# Patient Record
Sex: MALE | Race: WHITE | NOT HISPANIC OR LATINO | Employment: OTHER | URBAN - NONMETROPOLITAN AREA
[De-identification: names, ages, dates, MRNs, and addresses within clinical notes are randomized per-mention and may not be internally consistent; named-entity substitution may affect disease eponyms.]

---

## 2017-07-12 ENCOUNTER — LAB (OUTPATIENT)
Dept: LAB | Facility: HOSPITAL | Age: 27
End: 2017-07-12

## 2017-07-12 ENCOUNTER — TRANSCRIBE ORDERS (OUTPATIENT)
Dept: ADMINISTRATIVE | Facility: HOSPITAL | Age: 27
End: 2017-07-12

## 2017-07-12 DIAGNOSIS — Z00.00 ROUTINE GENERAL MEDICAL EXAMINATION AT A HEALTH CARE FACILITY: Primary | ICD-10-CM

## 2017-07-12 DIAGNOSIS — Z00.00 ROUTINE GENERAL MEDICAL EXAMINATION AT A HEALTH CARE FACILITY: ICD-10-CM

## 2017-07-12 DIAGNOSIS — F84.0 AUTISTIC DISORDER, CURRENT OR ACTIVE STATE: ICD-10-CM

## 2017-07-12 DIAGNOSIS — F71 MODERATE INTELLECTUAL DISABILITIES: ICD-10-CM

## 2017-07-12 LAB
ALBUMIN SERPL-MCNC: 3.6 G/DL (ref 3.5–5)
ALBUMIN/GLOB SERPL: 1.2 G/DL (ref 1–2)
ALP SERPL-CCNC: 37 U/L (ref 38–126)
ALT SERPL W P-5'-P-CCNC: 49 U/L (ref 13–69)
ANION GAP SERPL CALCULATED.3IONS-SCNC: 14 MMOL/L
AST SERPL-CCNC: 41 U/L (ref 15–46)
BASOPHILS # BLD AUTO: 0.06 10*3/MM3 (ref 0–0.2)
BASOPHILS NFR BLD AUTO: 0.7 % (ref 0–2.5)
BILIRUB SERPL-MCNC: 0.6 MG/DL (ref 0.2–1.3)
BUN BLD-MCNC: 17 MG/DL (ref 7–20)
BUN/CREAT SERPL: 15.5 (ref 6.3–21.9)
CALCIUM SPEC-SCNC: 9.2 MG/DL (ref 8.4–10.2)
CHLORIDE SERPL-SCNC: 108 MMOL/L (ref 98–107)
CHOLEST SERPL-MCNC: 171 MG/DL (ref 0–199)
CO2 SERPL-SCNC: 24 MMOL/L (ref 26–30)
CREAT BLD-MCNC: 1.1 MG/DL (ref 0.6–1.3)
DEPRECATED RDW RBC AUTO: 41.9 FL (ref 37–54)
EOSINOPHIL # BLD AUTO: 0.54 10*3/MM3 (ref 0–0.7)
EOSINOPHIL NFR BLD AUTO: 6.3 % (ref 0–7)
ERYTHROCYTE [DISTWIDTH] IN BLOOD BY AUTOMATED COUNT: 12.3 % (ref 11.5–14.5)
GFR SERPL CREATININE-BSD FRML MDRD: 80 ML/MIN/1.73
GLOBULIN UR ELPH-MCNC: 3 GM/DL
GLUCOSE BLD-MCNC: 89 MG/DL (ref 74–98)
HCT VFR BLD AUTO: 45 % (ref 42–52)
HDLC SERPL-MCNC: 39 MG/DL (ref 40–60)
HGB BLD-MCNC: 14.8 G/DL (ref 14–18)
IMM GRANULOCYTES # BLD: 0.07 10*3/MM3 (ref 0–0.06)
IMM GRANULOCYTES NFR BLD: 0.8 % (ref 0–0.6)
LDLC SERPL CALC-MCNC: 99 MG/DL (ref 0–99)
LDLC/HDLC SERPL: 2.53 {RATIO}
LYMPHOCYTES # BLD AUTO: 2.88 10*3/MM3 (ref 0.6–3.4)
LYMPHOCYTES NFR BLD AUTO: 33.8 % (ref 10–50)
MCH RBC QN AUTO: 30.4 PG (ref 27–31)
MCHC RBC AUTO-ENTMCNC: 32.9 G/DL (ref 30–37)
MCV RBC AUTO: 92.4 FL (ref 80–94)
MONOCYTES # BLD AUTO: 1.08 10*3/MM3 (ref 0–0.9)
MONOCYTES NFR BLD AUTO: 12.7 % (ref 0–12)
NEUTROPHILS # BLD AUTO: 3.88 10*3/MM3 (ref 2–6.9)
NEUTROPHILS NFR BLD AUTO: 45.7 % (ref 37–80)
NRBC BLD MANUAL-RTO: 0 /100 WBC (ref 0–0)
PLATELET # BLD AUTO: 104 10*3/MM3 (ref 130–400)
PMV BLD AUTO: 10 FL (ref 6–12)
POTASSIUM BLD-SCNC: 4 MMOL/L (ref 3.5–5.1)
PROT SERPL-MCNC: 6.6 G/DL (ref 6.3–8.2)
RBC # BLD AUTO: 4.87 10*6/MM3 (ref 4.7–6.1)
SODIUM BLD-SCNC: 142 MMOL/L (ref 137–145)
TRIGL SERPL-MCNC: 167 MG/DL
VALPROATE SERPL-MCNC: 81.8 MCG/ML (ref 50–100)
VLDLC SERPL-MCNC: 33.4 MG/DL
WBC NRBC COR # BLD: 8.51 10*3/MM3 (ref 4.8–10.8)

## 2017-07-12 PROCEDURE — 80061 LIPID PANEL: CPT | Performed by: PSYCHIATRY & NEUROLOGY

## 2017-07-12 PROCEDURE — 80053 COMPREHEN METABOLIC PANEL: CPT | Performed by: PSYCHIATRY & NEUROLOGY

## 2017-07-12 PROCEDURE — 80164 ASSAY DIPROPYLACETIC ACD TOT: CPT | Performed by: PSYCHIATRY & NEUROLOGY

## 2017-07-12 PROCEDURE — 84146 ASSAY OF PROLACTIN: CPT | Performed by: PSYCHIATRY & NEUROLOGY

## 2017-07-12 PROCEDURE — 36415 COLL VENOUS BLD VENIPUNCTURE: CPT | Performed by: PSYCHIATRY & NEUROLOGY

## 2017-07-12 PROCEDURE — 80201 ASSAY OF TOPIRAMATE: CPT | Performed by: PSYCHIATRY & NEUROLOGY

## 2017-07-12 PROCEDURE — 83525 ASSAY OF INSULIN: CPT | Performed by: PSYCHIATRY & NEUROLOGY

## 2017-07-12 PROCEDURE — 85025 COMPLETE CBC W/AUTO DIFF WBC: CPT | Performed by: PSYCHIATRY & NEUROLOGY

## 2017-07-14 LAB
INSULIN SERPL-ACNC: 11.4 UIU/ML (ref 2.6–24.9)
PROLACTIN SERPL-MCNC: 90.2 NG/ML (ref 4–15.2)
TOPIRAMATE, SERUM: 4.2 UG/ML (ref 2–25)

## 2017-10-11 PROBLEM — D69.6 THROMBOCYTOPENIA (HCC): Status: ACTIVE | Noted: 2017-10-11

## 2017-10-13 ENCOUNTER — CONSULT (OUTPATIENT)
Dept: ONCOLOGY | Facility: CLINIC | Age: 27
End: 2017-10-13

## 2017-10-13 ENCOUNTER — LAB (OUTPATIENT)
Dept: LAB | Facility: HOSPITAL | Age: 27
End: 2017-10-13

## 2017-10-13 VITALS
HEART RATE: 104 BPM | TEMPERATURE: 97 F | SYSTOLIC BLOOD PRESSURE: 135 MMHG | WEIGHT: 246 LBS | DIASTOLIC BLOOD PRESSURE: 80 MMHG | HEIGHT: 68 IN | BODY MASS INDEX: 37.28 KG/M2 | RESPIRATION RATE: 20 BRPM

## 2017-10-13 DIAGNOSIS — D69.6 THROMBOCYTOPENIA (HCC): ICD-10-CM

## 2017-10-13 DIAGNOSIS — D69.6 THROMBOCYTOPENIA (HCC): Primary | ICD-10-CM

## 2017-10-13 LAB
ALBUMIN SERPL-MCNC: 3.8 G/DL (ref 3.5–5)
ALBUMIN/GLOB SERPL: 1.4 G/DL (ref 1–2)
ALP SERPL-CCNC: 44 U/L (ref 38–126)
ALT SERPL W P-5'-P-CCNC: 30 U/L (ref 13–69)
ANION GAP SERPL CALCULATED.3IONS-SCNC: 14.9 MMOL/L
AST SERPL-CCNC: 24 U/L (ref 15–46)
BASOPHILS # BLD AUTO: 0.05 10*3/MM3 (ref 0–0.2)
BASOPHILS NFR BLD AUTO: 0.6 % (ref 0–2.5)
BILIRUB SERPL-MCNC: 0.3 MG/DL (ref 0.2–1.3)
BUN BLD-MCNC: 15 MG/DL (ref 7–20)
BUN/CREAT SERPL: 13.6 (ref 6.3–21.9)
CALCIUM SPEC-SCNC: 9.8 MG/DL (ref 8.4–10.2)
CHLORIDE SERPL-SCNC: 109 MMOL/L (ref 98–107)
CO2 SERPL-SCNC: 26 MMOL/L (ref 26–30)
CREAT BLD-MCNC: 1.1 MG/DL (ref 0.6–1.3)
DEPRECATED RDW RBC AUTO: 40.9 FL (ref 37–54)
EOSINOPHIL # BLD AUTO: 0.43 10*3/MM3 (ref 0–0.7)
EOSINOPHIL NFR BLD AUTO: 5.5 % (ref 0–7)
ERYTHROCYTE [DISTWIDTH] IN BLOOD BY AUTOMATED COUNT: 12 % (ref 11.5–14.5)
FOLATE SERPL-MCNC: 10.5 NG/ML
GFR SERPL CREATININE-BSD FRML MDRD: 80 ML/MIN/1.73
GLOBULIN UR ELPH-MCNC: 2.7 GM/DL
GLUCOSE BLD-MCNC: 99 MG/DL (ref 74–98)
HCT VFR BLD AUTO: 44.2 % (ref 42–52)
HGB BLD-MCNC: 14.7 G/DL (ref 14–18)
IMM GRANULOCYTES # BLD: 0.04 10*3/MM3 (ref 0–0.06)
IMM GRANULOCYTES NFR BLD: 0.5 % (ref 0–0.6)
LYMPHOCYTES # BLD AUTO: 3.47 10*3/MM3 (ref 0.6–3.4)
LYMPHOCYTES NFR BLD AUTO: 44.7 % (ref 10–50)
MCH RBC QN AUTO: 30.8 PG (ref 27–31)
MCHC RBC AUTO-ENTMCNC: 33.3 G/DL (ref 30–37)
MCV RBC AUTO: 92.7 FL (ref 80–94)
MONOCYTES # BLD AUTO: 1.06 10*3/MM3 (ref 0–0.9)
MONOCYTES NFR BLD AUTO: 13.7 % (ref 0–12)
NEUTROPHILS # BLD AUTO: 2.71 10*3/MM3 (ref 2–6.9)
NEUTROPHILS NFR BLD AUTO: 35 % (ref 37–80)
NRBC BLD MANUAL-RTO: 0 /100 WBC (ref 0–0)
PLATELET # BLD AUTO: 110 10*3/MM3 (ref 130–400)
PMV BLD AUTO: 10.1 FL (ref 6–12)
POTASSIUM BLD-SCNC: 3.9 MMOL/L (ref 3.5–5.1)
PROT SERPL-MCNC: 6.5 G/DL (ref 6.3–8.2)
RBC # BLD AUTO: 4.77 10*6/MM3 (ref 4.7–6.1)
SODIUM BLD-SCNC: 146 MMOL/L (ref 137–145)
VIT B12 BLD-MCNC: 788 PG/ML (ref 239–931)
WBC NRBC COR # BLD: 7.76 10*3/MM3 (ref 4.8–10.8)

## 2017-10-13 PROCEDURE — 85025 COMPLETE CBC W/AUTO DIFF WBC: CPT | Performed by: NURSE PRACTITIONER

## 2017-10-13 PROCEDURE — 82607 VITAMIN B-12: CPT | Performed by: NURSE PRACTITIONER

## 2017-10-13 PROCEDURE — 85060 BLOOD SMEAR INTERPRETATION: CPT | Performed by: NURSE PRACTITIONER

## 2017-10-13 PROCEDURE — 36415 COLL VENOUS BLD VENIPUNCTURE: CPT | Performed by: INTERNAL MEDICINE

## 2017-10-13 PROCEDURE — 99214 OFFICE O/P EST MOD 30 MIN: CPT | Performed by: INTERNAL MEDICINE

## 2017-10-13 PROCEDURE — 80053 COMPREHEN METABOLIC PANEL: CPT | Performed by: NURSE PRACTITIONER

## 2017-10-13 PROCEDURE — 82746 ASSAY OF FOLIC ACID SERUM: CPT | Performed by: NURSE PRACTITIONER

## 2017-10-13 RX ORDER — DIVALPROEX SODIUM 250 MG/1
1000 TABLET, DELAYED RELEASE ORAL
COMMUNITY
End: 2019-11-25 | Stop reason: DRUGHIGH

## 2017-10-13 NOTE — PROGRESS NOTES
Subjective     PROBLEM LIST:  1. Thrombocytopenia  2. Autism  3. Epilepsy     CHIEF COMPLAINT: thrombocytopenia      HISTORY OF PRESENT ILLNESS:  The patient is a 27 y.o. year old male, referred for evaluation of low platelets.  In March 2017 he had a platelet count of 109.  In August 2017 platelets were 120.  The remainder of his CBC is normal.  His  who accompanies him says that she thinks this is new in the past 6 months.  He has not had any bruising or bleeding.  The patient's communication is limited but he does mention that his knees bother him and that he may have some abdominal pain.    REVIEW OF SYSTEMS:  Unable to perform due to mental disability    Past Medical History:   Diagnosis Date   • Adjustment disorder with disturbance of conduct    • Intellectual disability          Current Outpatient Prescriptions on File Prior to Visit   Medication Sig Dispense Refill   • busPIRone (BUSPAR) 15 MG tablet Take 15 mg by mouth 3 (three) times a day.     • diphenhydrAMINE (BENADRYL) 25 mg capsule Take 25 mg by mouth every night.     • loratadine (CLARITIN) 10 MG tablet Take 10 mg by mouth daily.     • risperiDONE (RisperDAL) 3 MG tablet Take 3 mg by mouth 2 (two) times a day.     • topiramate (TOPAMAX) 50 MG tablet Take 50 mg by mouth 2 (two) times a day.     • trihexyphenidyl (ARTANE) 2 MG tablet Take 2 mg by mouth 3 (three) times a day with meals.       No current facility-administered medications on file prior to visit.        Allergies   Allergen Reactions   • Ceclor [Cefaclor]    • Erythromycin    • Ritalin [Methylphenidate Hcl]        No past surgical history on file.    Social History     Social History   • Marital status: Single     Spouse name: N/A   • Number of children: N/A   • Years of education: N/A     Social History Main Topics   • Smoking status: Never Smoker   • Smokeless tobacco: None   • Alcohol use No   • Drug use: None   • Sexual activity: Not Asked     Other Topics Concern   • None  "    Social History Narrative   He lives in assisted living.  No alcohol or drug use    Family History   Problem Relation Age of Onset   • Family history unknown: Yes   Family history is unknown.    Objective     /80  Pulse 104  Temp 97 °F (36.1 °C) (Temporal Artery )   Resp 20  Ht 68\" (172.7 cm)  Wt 246 lb (112 kg)  BMI 37.4 kg/m2  Performance Status: 1  General: well appearing male in no acute distress  Neuro: alert and oriented  HEENT: sclera anicteric, oropharynx clear  Lymphatics: no cervical, supraclavicular, or axillary adenopathy  Cardiovascular: regular rate and rhythm, no murmurs  Lungs: clear to auscultation bilaterally  Abdomen: Some mild tenderness to palpation, soft, no palpable mass  Extremeties: no lower extremity edema  Skin: no rashes, lesions, bruising, or petechiae  Psych: mood and affect appropriate    Abs: CBC shows a platelet count of 110 today.  The remainder of the CBC is unremarkable.CMP is unremarkable.        Assessment/Plan     Alton Stallings is a 27 y.o. year old male ith a history of autism and epilepsy who presents for evaluation of chronic thrombocytopenia.  His plate count has been in the range of 110-120 over the past 6 months.  I do not have any older labs for comparison.  Given his young age, and lack of other blood abnormalities or symptoms, I think this is most likely medication related finding.  Valproic acid can cause thrombocytopenia in a significant number of patients, and this tends to be a dose-related phenomenon.  His  reports that his valproic acid dose was increased a few years ago.  We will also check a folic acid and vitamin B12 level today and review a peripheral smear.  We will see him back in a few weeks to review these results.           Ora Weeks MD    10/13/2017        "

## 2017-10-20 LAB
CYTOLOGIST CVX/VAG CYTO: NORMAL
PATH INTERP BLD-IMP: NORMAL

## 2017-11-03 ENCOUNTER — OFFICE VISIT (OUTPATIENT)
Dept: ONCOLOGY | Facility: CLINIC | Age: 27
End: 2017-11-03

## 2017-11-03 VITALS
WEIGHT: 246 LBS | HEART RATE: 105 BPM | TEMPERATURE: 97.4 F | DIASTOLIC BLOOD PRESSURE: 92 MMHG | BODY MASS INDEX: 37.4 KG/M2 | RESPIRATION RATE: 20 BRPM | SYSTOLIC BLOOD PRESSURE: 129 MMHG

## 2017-11-03 DIAGNOSIS — D69.6 THROMBOCYTOPENIA (HCC): Primary | ICD-10-CM

## 2017-11-03 PROCEDURE — 99213 OFFICE O/P EST LOW 20 MIN: CPT | Performed by: INTERNAL MEDICINE

## 2017-11-03 NOTE — PROGRESS NOTES
PROBLEM LIST:  1. Thrombocytopenia  2. Autism  3. Epilepsy        Subjective     HISTORY OF PRESENT ILLNESS:   Alton Dunlap returns for follow-up.   He is here to review his lab results.    Past Medical History, Past Surgical History, Social History, Family History have been reviewed and are without significant changes except as mentioned.    Review of Systems   A comprehensive 14 point review of systems was performed and was negative except as mentioned.    Medications:  The current medication list was reviewed in the EMR    ALLERGIES:    Allergies   Allergen Reactions   • Ceclor [Cefaclor]    • Erythromycin    • Ritalin [Methylphenidate Hcl]        Objective      There were no vitals taken for this visit.     Performance Status: 1    General: well appearing male in no acute distress  Neuro: alert and oriented  HEENT: sclera anicteric, oropharynx clear  Lymphatics: no cervical, supraclavicular, or axillary adenopathy  Cardiovascular: regular rate and rhythm, no murmurs  Lungs: clear to auscultation bilaterally  Abdomen: soft, nontender, nondistended.  No palpable organomegaly  Extremeties: no lower extremity edema  Skin: no rashes, lesions, bruising, or petechiae  Psych: mood and affect appropriate      RECENT LABS:  Results for ALTON DUNLAP (MRN [id number]) as of 11/3/2017 14:32   Ref. Range 10/13/2017 14:54   Glucose Latest Ref Range: 74 - 98 mg/dL 99 (H)   Sodium Latest Ref Range: 137 - 145 mmol/L 146 (H)   Potassium Latest Ref Range: 3.5 - 5.1 mmol/L 3.9   CO2 Latest Ref Range: 26.0 - 30.0 mmol/L 26.0   Chloride Latest Ref Range: 98 - 107 mmol/L 109 (H)   Anion Gap Latest Units: mmol/L 14.9   Creatinine Latest Ref Range: 0.60 - 1.30 mg/dL 1.10   BUN Latest Ref Range: 7 - 20 mg/dL 15   BUN/Creatinine Ratio Latest Ref Range: 6.3 - 21.9  13.6   Calcium Latest Ref Range: 8.4 - 10.2 mg/dL 9.8   eGFR Non African Amer Latest Ref Range: >60 mL/min/1.73 80   Alkaline Phosphatase Latest Ref Range: 38 -  126 U/L 44   Total Protein Latest Ref Range: 6.3 - 8.2 g/dL 6.5   ALT (SGPT) Latest Ref Range: 13 - 69 U/L 30   AST (SGOT) Latest Ref Range: 15 - 46 U/L 24   Total Bilirubin Latest Ref Range: 0.2 - 1.3 mg/dL 0.3   Albumin Latest Ref Range: 3.50 - 5.00 g/dL 3.80   Globulin Latest Units: gm/dL 2.7   A/G Ratio Latest Ref Range: 1.0 - 2.0 g/dL 1.4   Folate Latest Ref Range: >2.76 ng/mL 10.50   Vitamin B-12 Latest Ref Range: 239 - 931 pg/mL 788   WBC Latest Ref Range: 4.80 - 10.80 10*3/mm3 7.76   RBC Latest Ref Range: 4.70 - 6.10 10*6/mm3 4.77   Hemoglobin Latest Ref Range: 14.0 - 18.0 g/dL 14.7   Hematocrit Latest Ref Range: 42.0 - 52.0 % 44.2   RDW Latest Ref Range: 11.5 - 14.5 % 12.0   MCV Latest Ref Range: 80.0 - 94.0 fL 92.7   MCH Latest Ref Range: 27.0 - 31.0 pg 30.8   MCHC Latest Ref Range: 30.0 - 37.0 g/dL 33.3   MPV Latest Ref Range: 6.0 - 12.0 fL 10.1   Platelets Latest Ref Range: 130 - 400 10*3/mm3 110 (L)   RDW-SD Latest Ref Range: 37.0 - 54.0 fl 40.9   Neutrophil % Latest Ref Range: 37.0 - 80.0 % 35.0 (L)   Lymphocyte % Latest Ref Range: 10.0 - 50.0 % 44.7   Monocyte % Latest Ref Range: 0.0 - 12.0 % 13.7 (H)   Eosinophil % Latest Ref Range: 0.0 - 7.0 % 5.5   Basophil % Latest Ref Range: 0.0 - 2.5 % 0.6   Immature Grans % Latest Ref Range: 0.0 - 0.6 % 0.5   Neutrophils, Absolute Latest Ref Range: 2.00 - 6.90 10*3/mm3 2.71   Lymphocytes, Absolute Latest Ref Range: 0.60 - 3.40 10*3/mm3 3.47 (H)   Monocytes, Absolute Latest Ref Range: 0.00 - 0.90 10*3/mm3 1.06 (H)   Eosinophils, Absolute Latest Ref Range: 0.00 - 0.70 10*3/mm3 0.43   Basophils, Absolute Latest Ref Range: 0.00 - 0.20 10*3/mm3 0.05   Immature Grans, Absolute Latest Ref Range: 0.00 - 0.06 10*3/mm3 0.04   nRBC Latest Ref Range: 0.0 - 0.0 /100 WBC 0.0         Peripheral smear review noted mild thrombocytopenia, mild increase in lymphocytes and monocytes, consistent with a reactive process.    Assessment/Plan   Alton Stallings is a 27 y.o. year old  male with mild thrombocytopenia, stable over the past 3 months.  I suspect this is medication related and would not recommend any intervention at this point.  An alternative diagnosis is ITP, and if this were the case there is no indication for treatment.  For now we will just plan to recheck his labs in 3 months to make sure there are no changes.  We will see him back in 6 months.                  Visit time was 15 minutes, greater than 50% spent in counseling      Ora Weeks MD  [facility] Hematology and Oncology    11/3/2017          CC:

## 2017-11-15 ENCOUNTER — OFFICE VISIT (OUTPATIENT)
Dept: NEUROLOGY | Facility: CLINIC | Age: 27
End: 2017-11-15

## 2017-11-15 VITALS
WEIGHT: 247 LBS | BODY MASS INDEX: 37.44 KG/M2 | HEART RATE: 72 BPM | RESPIRATION RATE: 18 BRPM | DIASTOLIC BLOOD PRESSURE: 68 MMHG | SYSTOLIC BLOOD PRESSURE: 112 MMHG | HEIGHT: 68 IN

## 2017-11-15 DIAGNOSIS — D69.6 THROMBOCYTOPENIA (HCC): ICD-10-CM

## 2017-11-15 DIAGNOSIS — G40.009 PARTIAL IDIOPATHIC EPILEPSY WITH SEIZURES OF LOCALIZED ONSET, NOT INTRACTABLE, WITHOUT STATUS EPILEPTICUS (HCC): Primary | ICD-10-CM

## 2017-11-15 PROCEDURE — 99213 OFFICE O/P EST LOW 20 MIN: CPT | Performed by: PSYCHIATRY & NEUROLOGY

## 2017-11-15 RX ORDER — TOPIRAMATE 100 MG/1
100 TABLET, FILM COATED ORAL 2 TIMES DAILY
Qty: 60 TABLET | Refills: 11 | Status: SHIPPED | OUTPATIENT
Start: 2017-11-15 | End: 2018-11-15

## 2017-11-15 RX ORDER — BUSPIRONE HYDROCHLORIDE 10 MG/1
20 TABLET ORAL 3 TIMES DAILY
COMMUNITY
End: 2021-11-23 | Stop reason: DRUGHIGH

## 2017-11-15 NOTE — PROGRESS NOTES
"Subjective:     Patient ID: Alton Stallings is a 27 y.o. male.  Chief Complaint   Patient presents with   • Seizures       History of Present Illness     27 y.o. male with Autism, and childhood epilepsy  is seen in follow up.  Last visit on 10/3/16 continued VPA and TPM.      MRI Brain, my review, with small vessel disease  EEG - normal    No seizure activity on TPM and VPA.  Wt is stable.      Dr Weeks following thrombocytopenia plt 110.  Depakote thought to be reducing plt.    StThe following portions of the patient's history were reviewed and updated as appropriate: allergies, current medications, past family history, past medical history, past social history, past surgical history and problem list.    Review of Systems   Constitutional: Positive for fatigue. Negative for activity change and unexpected weight change.   HENT: Negative for facial swelling, hearing loss, tinnitus, trouble swallowing and voice change.    Eyes: Negative for photophobia and pain.   Respiratory: Negative for choking.    Gastrointestinal: Negative for constipation.   Endocrine: Negative for cold intolerance.   Genitourinary: Negative for difficulty urinating, frequency and urgency.   Musculoskeletal: Positive for gait problem. Negative for arthralgias, back pain, myalgias, neck pain and neck stiffness.   Skin: Negative for rash.   Allergic/Immunologic: Negative for immunocompromised state.   Neurological: Positive for syncope. Negative for dizziness, tremors, facial asymmetry, weakness, light-headedness and numbness.   Hematological: Negative for adenopathy.   Psychiatric/Behavioral: Positive for decreased concentration and dysphoric mood. Negative for hallucinations and sleep disturbance. The patient is not nervous/anxious.         Objective:  Vitals:    11/15/17 1020   BP: 112/68   BP Location: Left arm   Patient Position: Sitting   Cuff Size: Adult   Pulse: 72   Resp: 18   Weight: 247 lb (112 kg)   Height: 68\" (172.7 cm) "       Neurologic Exam     Mental Status   Oriented to person.   Oriented to place. Disoriented to street and number. Oriented to country, city and area.   Disoriented to time. Disoriented to year, month, date, day and season.   Registration: recalls 3 of 3 objects. Recall at 5 minutes: recalls 1 of 3 objects. Follows 3 step commands.   Attention: decreased. Concentration: decreased.   Speech: speech is normal   Level of consciousness: alert  Knowledge: consistent with education and poor. Unable to perform simple calculations.   Able to name object. Unable to read. Able to repeat. Unable to write. Abnormal comprehension.     Cranial Nerves     CN II   Visual fields full to confrontation.   Visual acuity: normal  Right visual field deficit: none  Left visual field deficit: none     CN III, IV, VI   Pupils are equal, round, and reactive to light.  Extraocular motions are normal.   Right pupil: Shape: regular. Reactivity: brisk. Consensual response: intact.   Left pupil: Shape: regular. Reactivity: brisk. Consensual response: intact.   Nystagmus: none   Diplopia: none  Ophthalmoparesis: right abduction and left abduction  Upgaze: normal  Downgaze: normal  Conjugate gaze: absent  Vestibulo-ocular reflex: present    CN V   Facial sensation intact.   Right corneal reflex: normal  Left corneal reflex: normal    CN VII   Right facial weakness: none  Left facial weakness: none    CN VIII   Hearing: intact    CN IX, X   Palate: symmetric  Right gag reflex: normal  Left gag reflex: normal    CN XI   Right sternocleidomastoid strength: normal  Left sternocleidomastoid strength: normal    CN XII   Tongue: not atrophic  Fasciculations: absent  Tongue deviation: none    Motor Exam   Muscle bulk: normal  Overall muscle tone: decreased  Right arm tone: normal  Left arm tone: normal  Right leg tone: normal  Left leg tone: normal    Strength   Strength 5/5 throughout.   Right neck flexion: 4/5  Left neck flexion: 4/5  Right neck  extension: 4/5  Left neck extension: 4/5  Right deltoid: 4/5  Left deltoid: 4/5  Right biceps: 4/5  Left biceps: 4/5  Right triceps: 4/5  Left triceps: 4/5  Right wrist flexion: 4/5  Left wrist flexion: 4/5  Right wrist extension: 4/5  Left wrist extension: 4/5  Right interossei: 4/5  Left interossei: 4/5  Right abdominals: 4/5  Left abdominals: 4/5  Right iliopsoas: 4/5  Left iliopsoas: 4/5  Right quadriceps: 4/5  Left quadriceps: 4/5  Right hamstrin/5  Left hamstrin/5  Right glutei: 4/5  Left glutei: 4/5  Right anterior tibial: 4/5  Left anterior tibial: 4/5  Right posterior tibial: 4/5  Left posterior tibial: 4/5  Right peroneal: 4/5  Left peroneal: 4/5  Right gastroc: 4/5  Left gastroc: 4/5    Sensory Exam   Light touch normal.   Vibration normal.   Proprioception normal.   Pinprick normal.     Gait, Coordination, and Reflexes     Gait  Gait: wide-based    Coordination   Romberg: negative  Finger to nose coordination: normal  Heel to shin coordination: normal  Tandem walking coordination: normal    Tremor   Resting tremor: absent  Intention tremor: absent  Action tremor: absent    Reflexes   Reflexes 2+ except as noted.       Physical Exam   Constitutional: Vital signs are normal. He appears well-developed and well-nourished.   HENT:   Head: Normocephalic and atraumatic.   Eyes: EOM and lids are normal. Pupils are equal, round, and reactive to light.   Fundoscopic exam:       The right eye shows no exudate, no hemorrhage and no papilledema. The right eye shows venous pulsations.        The left eye shows no exudate, no hemorrhage and no papilledema. The left eye shows venous pulsations.   Neck: Normal range of motion and phonation normal. Normal carotid pulses present. Carotid bruit is not present. No thyroid mass and no thyromegaly present.   Cardiovascular: Normal rate, regular rhythm and normal heart sounds.    Pulmonary/Chest: Effort normal.   Neurological: He has normal strength. He has a normal  Finger-Nose-Finger Test, a normal Heel to Shin Test, a normal Romberg Test and a normal Tandem Gait Test.   Skin: Skin is warm and dry.   Psychiatric: He has a normal mood and affect. His speech is normal.   Nursing note and vitals reviewed.      Assessment/Plan:       Problems Addressed this Visit        Nervous and Auditory    Epilepsy - Primary     Controlled on TPM              Hematopoietic and Hemostatic    Thrombocytopenia     OK to discontinue VPA as not necessary for sz control

## 2017-11-17 ENCOUNTER — APPOINTMENT (OUTPATIENT)
Dept: LAB | Facility: HOSPITAL | Age: 27
End: 2017-11-17

## 2017-11-17 ENCOUNTER — TRANSCRIBE ORDERS (OUTPATIENT)
Dept: ADMINISTRATIVE | Facility: HOSPITAL | Age: 27
End: 2017-11-17

## 2017-11-17 DIAGNOSIS — F71 MODERATE INTELLECTUAL DISABILITIES: ICD-10-CM

## 2017-11-17 DIAGNOSIS — Z00.00 ENCOUNTER FOR PREVENTIVE HEALTH EXAMINATION: Primary | ICD-10-CM

## 2017-11-17 DIAGNOSIS — F84.0 AUTISM SPECTRUM DISORDER: ICD-10-CM

## 2017-11-17 PROCEDURE — 83525 ASSAY OF INSULIN: CPT | Performed by: PSYCHIATRY & NEUROLOGY

## 2017-11-17 PROCEDURE — 36415 COLL VENOUS BLD VENIPUNCTURE: CPT | Performed by: PSYCHIATRY & NEUROLOGY

## 2017-11-17 PROCEDURE — 84146 ASSAY OF PROLACTIN: CPT | Performed by: PSYCHIATRY & NEUROLOGY

## 2017-11-18 LAB
INSULIN SERPL-ACNC: 35.8 UIU/ML (ref 2.6–24.9)
PROLACTIN SERPL-MCNC: 78.3 NG/ML (ref 4–15.2)

## 2018-05-04 ENCOUNTER — TELEPHONE (OUTPATIENT)
Dept: ONCOLOGY | Facility: CLINIC | Age: 28
End: 2018-05-04

## 2018-05-04 ENCOUNTER — LAB (OUTPATIENT)
Dept: LAB | Facility: HOSPITAL | Age: 28
End: 2018-05-04

## 2018-05-04 ENCOUNTER — OFFICE VISIT (OUTPATIENT)
Dept: ONCOLOGY | Facility: CLINIC | Age: 28
End: 2018-05-04

## 2018-05-04 VITALS
BODY MASS INDEX: 37.22 KG/M2 | WEIGHT: 244.8 LBS | TEMPERATURE: 97.7 F | RESPIRATION RATE: 20 BRPM | OXYGEN SATURATION: 92 %

## 2018-05-04 DIAGNOSIS — D69.6 THROMBOCYTOPENIA (HCC): ICD-10-CM

## 2018-05-04 DIAGNOSIS — D69.6 THROMBOCYTOPENIA (HCC): Primary | ICD-10-CM

## 2018-05-04 LAB
BASOPHILS # BLD AUTO: 0.06 10*3/MM3 (ref 0–0.2)
BASOPHILS NFR BLD AUTO: 0.7 % (ref 0–2.5)
DEPRECATED RDW RBC AUTO: 41.1 FL (ref 37–54)
EOSINOPHIL # BLD AUTO: 0.27 10*3/MM3 (ref 0–0.7)
EOSINOPHIL NFR BLD AUTO: 3 % (ref 0–7)
ERYTHROCYTE [DISTWIDTH] IN BLOOD BY AUTOMATED COUNT: 11.9 % (ref 11.5–14.5)
HCT VFR BLD AUTO: 44.2 % (ref 42–52)
HGB BLD-MCNC: 14.7 G/DL (ref 14–18)
IMM GRANULOCYTES # BLD: 0.07 10*3/MM3 (ref 0–0.06)
IMM GRANULOCYTES NFR BLD: 0.8 % (ref 0–0.6)
LYMPHOCYTES # BLD AUTO: 3.41 10*3/MM3 (ref 0.6–3.4)
LYMPHOCYTES NFR BLD AUTO: 37.4 % (ref 10–50)
MCH RBC QN AUTO: 31 PG (ref 27–31)
MCHC RBC AUTO-ENTMCNC: 33.3 G/DL (ref 30–37)
MCV RBC AUTO: 93.2 FL (ref 80–94)
MONOCYTES # BLD AUTO: 1.33 10*3/MM3 (ref 0–0.9)
MONOCYTES NFR BLD AUTO: 14.6 % (ref 0–12)
NEUTROPHILS # BLD AUTO: 3.98 10*3/MM3 (ref 2–6.9)
NEUTROPHILS NFR BLD AUTO: 43.5 % (ref 37–80)
NRBC BLD MANUAL-RTO: 0 /100 WBC (ref 0–0)
PLATELET # BLD AUTO: 127 10*3/MM3 (ref 130–400)
PMV BLD AUTO: 10.1 FL (ref 6–12)
RBC # BLD AUTO: 4.74 10*6/MM3 (ref 4.7–6.1)
WBC NRBC COR # BLD: 9.12 10*3/MM3 (ref 4.8–10.8)

## 2018-05-04 PROCEDURE — 36415 COLL VENOUS BLD VENIPUNCTURE: CPT

## 2018-05-04 PROCEDURE — 99213 OFFICE O/P EST LOW 20 MIN: CPT | Performed by: INTERNAL MEDICINE

## 2018-05-04 PROCEDURE — 85025 COMPLETE CBC W/AUTO DIFF WBC: CPT

## 2018-05-04 NOTE — TELEPHONE ENCOUNTER
Platelet count today 127 - stable.  Called and discussed with caretaker at [facility].  I would recommend continued monitoring, but he doesn't need to f/u with hematology unless there is a significant change.

## 2018-05-04 NOTE — PROGRESS NOTES
PROBLEM LIST:  1. Thrombocytopenia  2. Autism  3. Epilepsy        Subjective     HISTORY OF PRESENT ILLNESS:   Alton Stallings returns for follow-up.   He is here to review his lab results.  He denies any bruising or bleeding.  He has not had nosebleeds.  He had labs in February which showed a platelet count in the 140s.    Past Medical History, Past Surgical History, Social History, Family History have been reviewed and are without significant changes except as mentioned.    Review of Systems   A comprehensive 14 point review of systems was performed and was negative except as mentioned.    Medications:  The current medication list was reviewed in the EMR    ALLERGIES:    Allergies   Allergen Reactions   • Ceclor [Cefaclor]    • Erythromycin    • Ritalin [Methylphenidate Hcl]        Objective      Temp 97.7 °F (36.5 °C) (Tympanic)   Resp 20   Wt 111 kg (244 lb 12.8 oz)   SpO2 92%   BMI 37.22 kg/m²      Performance Status: 1    General: well appearing male in no acute distress  Neuro: alert and oriented  HEENT: sclera anicteric, oropharynx clear  Lymphatics: no cervical, supraclavicular, or axillary adenopathy  Cardiovascular: regular rate and rhythm, no murmurs  Lungs: clear to auscultation bilaterally  Abdomen: soft, nontender, nondistended.  No palpable organomegaly  Extremeties: no lower extremity edema  Skin: no rashes, lesions, bruising, or petechiae  Psych: mood and affect appropriate    Labs 2/13/18 - plt 141      Assessment/Plan   Alton Stallings is a 28 y.o. year old male with stable mild thrombocytopenia.  I suspect this is medication related but does not require any medication changes or other intervention at this time.    We will check labs again today.  If his platelets are stable in the same range, I do not think further evaluation is needed at this point.  I will not schedule any follow-up.  I will contact his caretakers with the results of today's labs.  I would be certainly happy to  see him back in the future if there is any significant change in his platelet count.                  Visit time was 15 minutes, greater than 50% spent in counseling      Ora Weeks MD  [facility] Hematology and Oncology    5/4/2018          CC:

## 2018-05-22 ENCOUNTER — LAB (OUTPATIENT)
Dept: LAB | Facility: HOSPITAL | Age: 28
End: 2018-05-22

## 2018-05-22 ENCOUNTER — TRANSCRIBE ORDERS (OUTPATIENT)
Dept: ADMINISTRATIVE | Facility: HOSPITAL | Age: 28
End: 2018-05-22

## 2018-05-22 DIAGNOSIS — Z00.00 ROUTINE GENERAL MEDICAL EXAMINATION AT A HEALTH CARE FACILITY: Primary | ICD-10-CM

## 2018-05-22 DIAGNOSIS — F84.0 AUTISTIC SPECTRUM DISORDER: ICD-10-CM

## 2018-05-22 DIAGNOSIS — F71 MODERATE INTELLECTUAL DISABILITIES: ICD-10-CM

## 2018-05-22 DIAGNOSIS — Z00.00 ROUTINE GENERAL MEDICAL EXAMINATION AT A HEALTH CARE FACILITY: ICD-10-CM

## 2018-05-22 LAB
ALBUMIN SERPL-MCNC: 3.7 G/DL (ref 3.5–5)
ALBUMIN/GLOB SERPL: 1.2 G/DL (ref 1–2)
ALP SERPL-CCNC: 44 U/L (ref 38–126)
ALT SERPL W P-5'-P-CCNC: 34 U/L (ref 13–69)
ANION GAP SERPL CALCULATED.3IONS-SCNC: 12.9 MMOL/L (ref 10–20)
AST SERPL-CCNC: 29 U/L (ref 15–46)
BASOPHILS # BLD AUTO: 0.05 10*3/MM3 (ref 0–0.2)
BASOPHILS NFR BLD AUTO: 0.6 % (ref 0–2.5)
BILIRUB SERPL-MCNC: 0.5 MG/DL (ref 0.2–1.3)
BILIRUB UR QL STRIP: NEGATIVE
BUN BLD-MCNC: 12 MG/DL (ref 7–20)
BUN/CREAT SERPL: 12 (ref 6.3–21.9)
CALCIUM SPEC-SCNC: 9.2 MG/DL (ref 8.4–10.2)
CHLORIDE SERPL-SCNC: 107 MMOL/L (ref 98–107)
CHOLEST SERPL-MCNC: 167 MG/DL (ref 0–199)
CLARITY UR: CLEAR
CO2 SERPL-SCNC: 27 MMOL/L (ref 26–30)
COLOR UR: YELLOW
CREAT BLD-MCNC: 1 MG/DL (ref 0.6–1.3)
DEPRECATED RDW RBC AUTO: 41.6 FL (ref 37–54)
EOSINOPHIL # BLD AUTO: 0.25 10*3/MM3 (ref 0–0.7)
EOSINOPHIL NFR BLD AUTO: 3 % (ref 0–7)
ERYTHROCYTE [DISTWIDTH] IN BLOOD BY AUTOMATED COUNT: 12.1 % (ref 11.5–14.5)
GFR SERPL CREATININE-BSD FRML MDRD: 89 ML/MIN/1.73
GLOBULIN UR ELPH-MCNC: 3.2 GM/DL
GLUCOSE BLD-MCNC: 90 MG/DL (ref 74–98)
GLUCOSE UR STRIP-MCNC: NEGATIVE MG/DL
HBA1C MFR BLD: 5.7 % (ref 3–6)
HCT VFR BLD AUTO: 44.6 % (ref 42–52)
HDLC SERPL-MCNC: 37 MG/DL (ref 40–60)
HGB BLD-MCNC: 14.8 G/DL (ref 14–18)
HGB UR QL STRIP.AUTO: NEGATIVE
IMM GRANULOCYTES # BLD: 0.15 10*3/MM3 (ref 0–0.06)
IMM GRANULOCYTES NFR BLD: 1.8 % (ref 0–0.6)
KETONES UR QL STRIP: NEGATIVE
LDLC SERPL CALC-MCNC: 96 MG/DL (ref 0–99)
LDLC/HDLC SERPL: 2.59 {RATIO}
LEUKOCYTE ESTERASE UR QL STRIP.AUTO: NEGATIVE
LYMPHOCYTES # BLD AUTO: 2.82 10*3/MM3 (ref 0.6–3.4)
LYMPHOCYTES NFR BLD AUTO: 33.6 % (ref 10–50)
MCH RBC QN AUTO: 30.9 PG (ref 27–31)
MCHC RBC AUTO-ENTMCNC: 33.2 G/DL (ref 30–37)
MCV RBC AUTO: 93.1 FL (ref 80–94)
MONOCYTES # BLD AUTO: 0.99 10*3/MM3 (ref 0–0.9)
MONOCYTES NFR BLD AUTO: 11.8 % (ref 0–12)
NEUTROPHILS # BLD AUTO: 4.13 10*3/MM3 (ref 2–6.9)
NEUTROPHILS NFR BLD AUTO: 49.2 % (ref 37–80)
NITRITE UR QL STRIP: NEGATIVE
NRBC BLD MANUAL-RTO: 0 /100 WBC (ref 0–0)
PH UR STRIP.AUTO: 7 [PH] (ref 5–8)
PLATELET # BLD AUTO: 132 10*3/MM3 (ref 130–400)
PMV BLD AUTO: 9.5 FL (ref 6–12)
POTASSIUM BLD-SCNC: 3.9 MMOL/L (ref 3.5–5.1)
PROT SERPL-MCNC: 6.9 G/DL (ref 6.3–8.2)
PROT UR QL STRIP: NEGATIVE
RBC # BLD AUTO: 4.79 10*6/MM3 (ref 4.7–6.1)
SODIUM BLD-SCNC: 143 MMOL/L (ref 137–145)
SP GR UR STRIP: 1.02 (ref 1–1.03)
TRIGL SERPL-MCNC: 170 MG/DL
UROBILINOGEN UR QL STRIP: NORMAL
VALPROATE SERPL-MCNC: 90.2 MCG/ML (ref 50–100)
VLDLC SERPL-MCNC: 34 MG/DL
WBC NRBC COR # BLD: 8.39 10*3/MM3 (ref 4.8–10.8)

## 2018-05-22 PROCEDURE — 80053 COMPREHEN METABOLIC PANEL: CPT

## 2018-05-22 PROCEDURE — 84146 ASSAY OF PROLACTIN: CPT

## 2018-05-22 PROCEDURE — 81003 URINALYSIS AUTO W/O SCOPE: CPT

## 2018-05-22 PROCEDURE — 80061 LIPID PANEL: CPT

## 2018-05-22 PROCEDURE — 83036 HEMOGLOBIN GLYCOSYLATED A1C: CPT

## 2018-05-22 PROCEDURE — 83525 ASSAY OF INSULIN: CPT

## 2018-05-22 PROCEDURE — 85025 COMPLETE CBC W/AUTO DIFF WBC: CPT

## 2018-05-22 PROCEDURE — 36415 COLL VENOUS BLD VENIPUNCTURE: CPT

## 2018-05-22 PROCEDURE — 80164 ASSAY DIPROPYLACETIC ACD TOT: CPT

## 2018-05-23 LAB
INSULIN SERPL-ACNC: 12.3 UIU/ML (ref 2.6–24.9)
PROLACTIN SERPL-MCNC: 71.1 NG/ML (ref 4–15.2)

## 2018-11-07 ENCOUNTER — OFFICE VISIT (OUTPATIENT)
Dept: NEUROLOGY | Facility: CLINIC | Age: 28
End: 2018-11-07

## 2018-11-07 VITALS
HEART RATE: 82 BPM | SYSTOLIC BLOOD PRESSURE: 108 MMHG | WEIGHT: 257 LBS | HEIGHT: 68 IN | DIASTOLIC BLOOD PRESSURE: 80 MMHG | BODY MASS INDEX: 38.95 KG/M2 | RESPIRATION RATE: 16 BRPM

## 2018-11-07 DIAGNOSIS — G40.009 PARTIAL IDIOPATHIC EPILEPSY WITH SEIZURES OF LOCALIZED ONSET, NOT INTRACTABLE, WITHOUT STATUS EPILEPTICUS (HCC): Primary | ICD-10-CM

## 2018-11-07 PROCEDURE — 99213 OFFICE O/P EST LOW 20 MIN: CPT | Performed by: PSYCHIATRY & NEUROLOGY

## 2018-11-07 NOTE — PROGRESS NOTES
"Subjective:     Patient ID: Alton Stallings is a 28 y.o. male.  Chief Complaint   Patient presents with   • Seizures       History of Present Illness     28 y.o. male with Autism, and childhood epilepsy  is seen in follow up.  Last visit on 11/15/17 recommended ok to stop VPA and continued TPM.      MRI Brain, my review, with small vessel disease  EEG - normal    Sz controlled on TPM and VPA.  Psychiatry wished for him to remain on medication.  Plt count increased 132.          The following portions of the patient's history were reviewed and updated as appropriate: allergies, current medications, past family history, past medical history, past social history, past surgical history and problem list.    Review of Systems   Constitutional: Positive for fatigue. Negative for activity change and unexpected weight change.   HENT: Negative for facial swelling, hearing loss, tinnitus, trouble swallowing and voice change.    Eyes: Negative for photophobia and pain.   Respiratory: Negative for choking.    Gastrointestinal: Negative for constipation.   Endocrine: Negative for cold intolerance.   Genitourinary: Negative for difficulty urinating, frequency and urgency.   Musculoskeletal: Positive for gait problem. Negative for arthralgias, back pain, myalgias, neck pain and neck stiffness.   Skin: Negative for rash.   Allergic/Immunologic: Negative for immunocompromised state.   Neurological: Positive for seizures and syncope. Negative for dizziness, tremors, facial asymmetry, weakness, light-headedness and numbness.   Hematological: Negative for adenopathy.   Psychiatric/Behavioral: Positive for decreased concentration and dysphoric mood. Negative for hallucinations and sleep disturbance. The patient is not nervous/anxious.         Objective:  Vitals:    11/07/18 1025   BP: 108/80   BP Location: Left arm   Patient Position: Sitting   Cuff Size: Adult   Pulse: 82   Resp: 16   Weight: 117 kg (257 lb)   Height: 172.7 cm (68\") "       Neurologic Exam     Mental Status   Oriented to person.   Oriented to place. Disoriented to street and number. Oriented to country, city and area.   Disoriented to time. Disoriented to year, month, date, day and season.   Registration: recalls 3 of 3 objects. Recall at 5 minutes: recalls 1 of 3 objects. Follows 3 step commands.   Attention: decreased. Concentration: decreased.   Level of consciousness: alert  Knowledge: consistent with education and poor. Unable to perform simple calculations.   Able to name object. Unable to read. Able to repeat. Unable to write. Abnormal comprehension.     Cranial Nerves     CN II   Visual fields full to confrontation.   Visual acuity: normal  Right visual field deficit: none  Left visual field deficit: none     CN III, IV, VI   Right pupil: Shape: regular. Reactivity: brisk. Consensual response: intact.   Left pupil: Shape: regular. Reactivity: brisk. Consensual response: intact.   Nystagmus: none   Diplopia: none  Ophthalmoparesis: right abduction and left abduction  Upgaze: normal  Downgaze: normal  Conjugate gaze: absent  Vestibulo-ocular reflex: present    CN V   Facial sensation intact.   Right corneal reflex: normal  Left corneal reflex: normal    CN VII   Right facial weakness: none  Left facial weakness: none    CN VIII   Hearing: intact    CN IX, X   Palate: symmetric  Right gag reflex: normal  Left gag reflex: normal    CN XI   Right sternocleidomastoid strength: normal  Left sternocleidomastoid strength: normal    CN XII   Tongue: not atrophic  Fasciculations: absent  Tongue deviation: none    Motor Exam   Muscle bulk: normal  Overall muscle tone: decreased  Right arm tone: normal  Left arm tone: normal  Right leg tone: normal  Left leg tone: normal    Strength   Right neck flexion: 4/5  Left neck flexion: 4/5  Right neck extension: 4/5  Left neck extension: 4/5  Right deltoid: 4/5  Left deltoid: 4/5  Right biceps: 4/5  Left biceps: 4/5  Right triceps: 4/5  Left  triceps: 4/5  Right wrist flexion: 4/5  Left wrist flexion: 4/5  Right wrist extension: 4/5  Left wrist extension: 4/5  Right interossei: 4/5  Left interossei: 4/5  Right abdominals: 4/5  Left abdominals: 4/5  Right iliopsoas: 4/5  Left iliopsoas: 4/5  Right quadriceps: 4/5  Left quadriceps: 4/5  Right hamstrin/5  Left hamstrin/5  Right glutei: 4/5  Left glutei: 4/5  Right anterior tibial: 4/5  Left anterior tibial: 4/5  Right posterior tibial: 4/5  Left posterior tibial: 4/5  Right peroneal: 4/5  Left peroneal: 4/5  Right gastroc: 4/5  Left gastroc: 4/5    Sensory Exam   Light touch normal.   Vibration normal.   Proprioception normal.   Pinprick normal.     Gait, Coordination, and Reflexes     Gait  Gait: wide-based    Tremor   Resting tremor: absent  Intention tremor: absent  Action tremor: absent    Reflexes   Reflexes 2+ except as noted.       Physical Exam   Constitutional: He appears well-developed and well-nourished.   Nursing note and vitals reviewed.      Assessment/Plan:       Problems Addressed this Visit        Nervous and Auditory    Epilepsy (CMS/HCC) - Primary     Sz controlled on TPM and VPA

## 2019-03-05 ENCOUNTER — OFFICE VISIT (OUTPATIENT)
Dept: UROLOGY | Facility: CLINIC | Age: 29
End: 2019-03-05

## 2019-03-05 VITALS
OXYGEN SATURATION: 98 % | RESPIRATION RATE: 16 BRPM | TEMPERATURE: 98 F | BODY MASS INDEX: 38.95 KG/M2 | HEIGHT: 68 IN | HEART RATE: 110 BPM | WEIGHT: 257 LBS

## 2019-03-05 DIAGNOSIS — R35.0 URINARY FREQUENCY: Primary | ICD-10-CM

## 2019-03-05 LAB
BILIRUB BLD-MCNC: NEGATIVE MG/DL
CLARITY, POC: CLEAR
COLOR UR: YELLOW
GLUCOSE UR STRIP-MCNC: NEGATIVE MG/DL
KETONES UR QL: NEGATIVE
LEUKOCYTE EST, POC: NEGATIVE
NITRITE UR-MCNC: NEGATIVE MG/ML
PH UR: 6.5 [PH] (ref 5–8)
PROT UR STRIP-MCNC: NEGATIVE MG/DL
RBC # UR STRIP: NEGATIVE /UL
SP GR UR: 1.02 (ref 1–1.03)
UROBILINOGEN UR QL: NORMAL

## 2019-03-05 PROCEDURE — 51798 US URINE CAPACITY MEASURE: CPT | Performed by: UROLOGY

## 2019-03-05 PROCEDURE — 99203 OFFICE O/P NEW LOW 30 MIN: CPT | Performed by: UROLOGY

## 2019-03-05 RX ORDER — TOPIRAMATE 100 MG/1
100 TABLET, FILM COATED ORAL 2 TIMES DAILY
Refills: 2 | COMMUNITY
Start: 2019-02-11 | End: 2019-11-25 | Stop reason: DRUGHIGH

## 2019-03-05 NOTE — PROGRESS NOTES
Chief Complaint  Urinary Frequency        HPI  Chandrakant is a 29 y.o. male who is brought in by his caretakers for urinary frequency that seems to have progressed in the last 2 months.  He is on a number of medications that could potentially affect voiding because of his mental retardation and psychiatric problems.  The patient and his caretaker deny any significant ingestion of caffeine.  He does have minimal problems with incontinence and is observed to be dry today.    Vitals:    03/05/19 1534   Pulse: 110   Resp: 16   Temp: 98 °F (36.7 °C)   SpO2: 98%       Past Medical History  Past Medical History:   Diagnosis Date   • Adjustment disorder with disturbance of conduct    • Intellectual disability        Past Surgical History  No past surgical history on file.    Medications  has a current medication list which includes the following prescription(s): buspirone, diphenhydramine, divalproex, loratadine, risperidone, topiramate, and trihexyphenidyl.      Allergies  Allergies   Allergen Reactions   • Ceclor [Cefaclor]    • Erythromycin    • Ritalin [Methylphenidate Hcl]        Social History  Social History     Social History Narrative   • Not on file       Family History  He has no family history of bladder or kidney cancer  He has no family history of kidney stones      AUA Symptom Score:      Review of Systems  Review of Systems   Genitourinary: Positive for frequency.     He also is positive for confusion sleep disturbance and anxiety but otherwise negative.  Physical Exam  Physical Exam   Constitutional: He is oriented to person, place, and time. He appears well-developed and well-nourished.   HENT:   Head: Normocephalic and atraumatic.   Neck: Normal range of motion.   Pulmonary/Chest: Effort normal. No respiratory distress.   Abdominal: Soft. He exhibits no distension and no mass. There is no tenderness. No hernia.   Genitourinary: Penis normal.   Musculoskeletal: Normal range of motion.   Lymphadenopathy:     He  has no cervical adenopathy.   Neurological: He is alert and oriented to person, place, and time.   Skin: Skin is warm and dry.   Psychiatric: He has a normal mood and affect. His behavior is normal.   Vitals reviewed.      Labs Recent and today in the office:  Results for orders placed or performed in visit on 03/05/19   POC Urinalysis Dipstick, Automated   Result Value Ref Range    Color Yellow Yellow, Straw, Dark Yellow, Gosia    Clarity, UA Clear Clear    Specific Gravity  1.020 1.005 - 1.030    pH, Urine 6.5 5.0 - 8.0    Leukocytes Negative Negative    Nitrite, UA Negative Negative    Protein, POC Negative Negative mg/dL    Glucose, UA Negative Negative, 1000 mg/dL (3+) mg/dL    Ketones, UA Negative Negative    Urobilinogen, UA Normal Normal    Bilirubin Negative Negative    Blood, UA Negative Negative     Bladder scan: Reveals 57.7 mL of postvoid residual and a small prostate.  Assessment & Plan  Urinary frequency/overactive bladder: With a long list of medications he is taking I am sure they are in part responsible but he also is carrying a 2 ounce postvoid residual.  I suggested a round of Flomax and then return and recheck his postvoid residual.  He may benefit from Ditropan if not improved.  He has had some problems with constipation that they are treating.

## 2019-03-06 ENCOUNTER — TELEPHONE (OUTPATIENT)
Dept: UROLOGY | Facility: CLINIC | Age: 29
End: 2019-03-06

## 2019-03-06 DIAGNOSIS — R33.9 URINARY RETENTION: Primary | ICD-10-CM

## 2019-03-06 RX ORDER — TAMSULOSIN HYDROCHLORIDE 0.4 MG/1
1 CAPSULE ORAL DAILY
Qty: 30 CAPSULE | Refills: 11 | Status: SHIPPED | OUTPATIENT
Start: 2019-03-06 | End: 2020-02-21

## 2019-03-06 NOTE — TELEPHONE ENCOUNTER
Patient called stating he was suppose to have a prescription sent to [facility] yesterday.

## 2019-04-16 ENCOUNTER — OFFICE VISIT (OUTPATIENT)
Dept: UROLOGY | Facility: CLINIC | Age: 29
End: 2019-04-16

## 2019-04-16 VITALS
BODY MASS INDEX: 38.95 KG/M2 | HEART RATE: 99 BPM | HEIGHT: 68 IN | DIASTOLIC BLOOD PRESSURE: 84 MMHG | WEIGHT: 257 LBS | SYSTOLIC BLOOD PRESSURE: 126 MMHG | OXYGEN SATURATION: 97 % | TEMPERATURE: 98.5 F

## 2019-04-16 DIAGNOSIS — R35.0 URINARY FREQUENCY: Primary | ICD-10-CM

## 2019-04-16 PROCEDURE — 99213 OFFICE O/P EST LOW 20 MIN: CPT | Performed by: UROLOGY

## 2019-04-16 PROCEDURE — 51798 US URINE CAPACITY MEASURE: CPT | Performed by: UROLOGY

## 2019-04-16 NOTE — PROGRESS NOTES
Chief Complaint  Urinary Frequency/OAB (6 weks fup.)        BETHANIE Stallings is a 29 y.o. male who is brought back in by his caretaker for follow-up with the original complaint of urinary frequency.  Since Alton has mental retardation and psychiatric issues and keeps his bathroom habits private it is hard to know what is going on for sure.  Everyone seems to deny any significant ingestion of caffeine.  He was started on Flomax when he was found to have a 2 ounce postvoid residual returns today without complaint.  Bladder scan today actually reveals 133 mL of postvoid residual so on a cholinergic medication.  He already has difficulty with constipation in any case.    Vitals:    04/16/19 0835   BP: 126/84   Pulse: 99   Temp: 98.5 °F (36.9 °C)   SpO2: 97%       Past Medical History  Past Medical History:   Diagnosis Date   • Adjustment disorder with disturbance of conduct    • Intellectual disability        Past Surgical History  No past surgical history on file.    Medications  has a current medication list which includes the following prescription(s): buspirone, diphenhydramine, divalproex, loratadine, risperidone, tamsulosin, topiramate, and trihexyphenidyl.      Allergies  Allergies   Allergen Reactions   • Ceclor [Cefaclor]    • Erythromycin    • Ritalin [Methylphenidate Hcl]        Social History  Social History     Social History Narrative   • Not on file       Family History  He has no family history of bladder or kidney cancer  He has no family history of kidney stones      AUA Symptom Score:      Review of Systems  Review of Systems    Physical Exam  Physical Exam   Constitutional: He is oriented to person, place, and time. He appears well-developed and well-nourished.   HENT:   Head: Normocephalic and atraumatic.   Neck: Normal range of motion.   Pulmonary/Chest: Effort normal. No respiratory distress.   Abdominal: Soft. He exhibits no distension and no mass. There is no tenderness. No hernia.   Musculoskeletal:  Normal range of motion.   Lymphadenopathy:     He has no cervical adenopathy.   Neurological: He is alert and oriented to person, place, and time.   Skin: Skin is warm and dry.   Psychiatric: He has a normal mood and affect. His behavior is normal.   Vitals reviewed.      Labs Recent and today in the office:  Results for orders placed or performed in visit on 04/16/19   POC Urinalysis Dipstick, Automated   Result Value Ref Range    Color Yellow Yellow, Straw, Dark Yellow, Gosia    Clarity, UA Clear Clear    Specific Gravity  1.020 1.005 - 1.030    pH, Urine 6.5 5.0 - 8.0    Leukocytes Negative Negative    Nitrite, UA Negative Negative    Protein, POC Negative Negative mg/dL    Glucose, UA Negative Negative, 1000 mg/dL (3+) mg/dL    Ketones, UA Negative Negative    Urobilinogen, UA Normal Normal    Bilirubin Negative Negative    Blood, UA Negative Negative     Bladder scan: Postvoid residual 133 mL    Assessment & Plan  Overactive bladder urinary retention: Currently the patient was without complaint and has clear urine.  He is now carrying a postvoid residual of 133 so I would recommend he continue the Flomax and withhold any anticholinergic medication at this time.

## 2019-10-16 ENCOUNTER — LAB (OUTPATIENT)
Dept: LAB | Facility: HOSPITAL | Age: 29
End: 2019-10-16

## 2019-10-16 ENCOUNTER — TRANSCRIBE ORDERS (OUTPATIENT)
Dept: LAB | Facility: HOSPITAL | Age: 29
End: 2019-10-16

## 2019-10-16 DIAGNOSIS — Z00.00 ENCOUNTER FOR PREVENTIVE HEALTH EXAMINATION: Primary | ICD-10-CM

## 2019-10-16 DIAGNOSIS — Z00.00 ENCOUNTER FOR PREVENTIVE HEALTH EXAMINATION: ICD-10-CM

## 2019-10-16 LAB
ALBUMIN SERPL-MCNC: 3.8 G/DL (ref 3.5–5.2)
ALBUMIN/GLOB SERPL: 1.3 G/DL
ALP SERPL-CCNC: 38 U/L (ref 39–117)
ALT SERPL W P-5'-P-CCNC: 11 U/L (ref 1–41)
ANION GAP SERPL CALCULATED.3IONS-SCNC: 11.8 MMOL/L (ref 5–15)
AST SERPL-CCNC: 12 U/L (ref 1–40)
BASOPHILS # BLD AUTO: 0.06 10*3/MM3 (ref 0–0.2)
BASOPHILS NFR BLD AUTO: 0.8 % (ref 0–1.5)
BILIRUB SERPL-MCNC: 0.5 MG/DL (ref 0.2–1.2)
BUN BLD-MCNC: 9 MG/DL (ref 6–20)
BUN/CREAT SERPL: 9.9 (ref 7–25)
CALCIUM SPEC-SCNC: 9.2 MG/DL (ref 8.6–10.5)
CHLORIDE SERPL-SCNC: 104 MMOL/L (ref 98–107)
CHOLEST SERPL-MCNC: 154 MG/DL (ref 0–200)
CO2 SERPL-SCNC: 23.2 MMOL/L (ref 22–29)
CREAT BLD-MCNC: 0.91 MG/DL (ref 0.76–1.27)
DEPRECATED RDW RBC AUTO: 41.4 FL (ref 37–54)
EOSINOPHIL # BLD AUTO: 0.46 10*3/MM3 (ref 0–0.4)
EOSINOPHIL NFR BLD AUTO: 6.1 % (ref 0.3–6.2)
ERYTHROCYTE [DISTWIDTH] IN BLOOD BY AUTOMATED COUNT: 12.6 % (ref 12.3–15.4)
GFR SERPL CREATININE-BSD FRML MDRD: 99 ML/MIN/1.73
GLOBULIN UR ELPH-MCNC: 3 GM/DL
GLUCOSE BLD-MCNC: 99 MG/DL (ref 65–99)
HBA1C MFR BLD: 5.93 % (ref 4.8–5.6)
HCT VFR BLD AUTO: 42.4 % (ref 37.5–51)
HDLC SERPL-MCNC: 39 MG/DL (ref 40–60)
HGB BLD-MCNC: 14.1 G/DL (ref 13–17.7)
IMM GRANULOCYTES # BLD AUTO: 0.04 10*3/MM3 (ref 0–0.05)
IMM GRANULOCYTES NFR BLD AUTO: 0.5 % (ref 0–0.5)
LDLC SERPL CALC-MCNC: 87 MG/DL (ref 0–100)
LDLC/HDLC SERPL: 2.23 {RATIO}
LYMPHOCYTES # BLD AUTO: 2.03 10*3/MM3 (ref 0.7–3.1)
LYMPHOCYTES NFR BLD AUTO: 26.9 % (ref 19.6–45.3)
MCH RBC QN AUTO: 30.1 PG (ref 26.6–33)
MCHC RBC AUTO-ENTMCNC: 33.3 G/DL (ref 31.5–35.7)
MCV RBC AUTO: 90.4 FL (ref 79–97)
MONOCYTES # BLD AUTO: 0.9 10*3/MM3 (ref 0.1–0.9)
MONOCYTES NFR BLD AUTO: 11.9 % (ref 5–12)
NEUTROPHILS # BLD AUTO: 4.07 10*3/MM3 (ref 1.7–7)
NEUTROPHILS NFR BLD AUTO: 53.8 % (ref 42.7–76)
NRBC BLD AUTO-RTO: 0 /100 WBC (ref 0–0.2)
PLATELET # BLD AUTO: 158 10*3/MM3 (ref 140–450)
PMV BLD AUTO: 10.9 FL (ref 6–12)
POTASSIUM BLD-SCNC: 4.1 MMOL/L (ref 3.5–5.2)
PROLACTIN SERPL-MCNC: 58.9 NG/ML (ref 4.04–15.2)
PROT SERPL-MCNC: 6.8 G/DL (ref 6–8.5)
RBC # BLD AUTO: 4.69 10*6/MM3 (ref 4.14–5.8)
SODIUM BLD-SCNC: 139 MMOL/L (ref 136–145)
TRIGL SERPL-MCNC: 140 MG/DL (ref 0–150)
VALPROATE SERPL-MCNC: 67 MCG/ML (ref 50–125)
VLDLC SERPL-MCNC: 28 MG/DL (ref 5–40)
WBC NRBC COR # BLD: 7.56 10*3/MM3 (ref 3.4–10.8)

## 2019-10-16 PROCEDURE — 80164 ASSAY DIPROPYLACETIC ACD TOT: CPT

## 2019-10-16 PROCEDURE — 80061 LIPID PANEL: CPT

## 2019-10-16 PROCEDURE — 83036 HEMOGLOBIN GLYCOSYLATED A1C: CPT

## 2019-10-16 PROCEDURE — 85025 COMPLETE CBC W/AUTO DIFF WBC: CPT

## 2019-10-16 PROCEDURE — 84146 ASSAY OF PROLACTIN: CPT

## 2019-10-16 PROCEDURE — 80053 COMPREHEN METABOLIC PANEL: CPT

## 2019-10-16 PROCEDURE — 36415 COLL VENOUS BLD VENIPUNCTURE: CPT

## 2019-11-25 ENCOUNTER — OFFICE VISIT (OUTPATIENT)
Dept: NEUROLOGY | Facility: CLINIC | Age: 29
End: 2019-11-25

## 2019-11-25 VITALS
WEIGHT: 203 LBS | RESPIRATION RATE: 16 BRPM | HEART RATE: 83 BPM | BODY MASS INDEX: 30.77 KG/M2 | HEIGHT: 68 IN | OXYGEN SATURATION: 100 % | DIASTOLIC BLOOD PRESSURE: 64 MMHG | SYSTOLIC BLOOD PRESSURE: 118 MMHG

## 2019-11-25 DIAGNOSIS — G40.009 PARTIAL IDIOPATHIC EPILEPSY WITH SEIZURES OF LOCALIZED ONSET, NOT INTRACTABLE, WITHOUT STATUS EPILEPTICUS (HCC): Primary | ICD-10-CM

## 2019-11-25 DIAGNOSIS — D69.6 THROMBOCYTOPENIA (HCC): ICD-10-CM

## 2019-11-25 PROCEDURE — 99213 OFFICE O/P EST LOW 20 MIN: CPT | Performed by: PSYCHIATRY & NEUROLOGY

## 2019-11-25 RX ORDER — TRAZODONE HYDROCHLORIDE 150 MG/1
TABLET ORAL
Refills: 2 | COMMUNITY
Start: 2019-10-24

## 2019-11-25 RX ORDER — DIVALPROEX SODIUM 500 MG/1
TABLET, DELAYED RELEASE ORAL 2 TIMES DAILY
Refills: 1 | COMMUNITY
Start: 2019-10-23

## 2019-11-25 NOTE — PROGRESS NOTES
"Subjective:     Patient ID: Alton Stallings is a 29 y.o. male.  Chief Complaint   Patient presents with   • Seizures       History of Present Illness     29 y.o. male with Autism, and childhood epilepsy  is seen in follow up.  Last visit on 11/7/18 continued VPA and TPM.      MRI Brain,  small vessel disease  EEG - normal    10/16/19 - VPA 67; A1C 5.93; ; Prolactin 58.9     Psychiatry reduced VPA and mood worsened.  Sz free on VPA and TPM.            The following portions of the patient's history were reviewed and updated as appropriate: allergies, current medications, past family history, past medical history, past social history, past surgical history and problem list.    Review of Systems   Constitutional: Positive for fatigue. Negative for activity change and unexpected weight change.   HENT: Negative for facial swelling, hearing loss, tinnitus, trouble swallowing and voice change.    Eyes: Negative for photophobia and pain.   Respiratory: Negative for choking.    Gastrointestinal: Negative for constipation.   Endocrine: Negative for cold intolerance.   Genitourinary: Negative for difficulty urinating, frequency and urgency.   Musculoskeletal: Positive for gait problem. Negative for arthralgias, back pain, myalgias, neck pain and neck stiffness.   Skin: Negative for rash.   Allergic/Immunologic: Negative for immunocompromised state.   Neurological: Positive for syncope. Negative for dizziness, tremors, facial asymmetry, weakness, light-headedness and numbness.   Hematological: Negative for adenopathy.   Psychiatric/Behavioral: Positive for decreased concentration and dysphoric mood. Negative for hallucinations and sleep disturbance. The patient is not nervous/anxious.         Objective:  Vitals:    11/25/19 1028   BP: 118/64   Pulse: 83   Resp: 16   SpO2: 100%   Weight: 92.1 kg (203 lb)   Height: 172.7 cm (68\")       Neurologic Exam     Mental Status   Oriented to person.   Oriented to place. " Disoriented to street and number. Oriented to country, city and area.   Disoriented to time. Disoriented to year, month, date, day and season.   Registration: recalls 3 of 3 objects. Recall at 5 minutes: recalls 1 of 3 objects. Follows 3 step commands.   Attention: decreased. Concentration: decreased.   Level of consciousness: alert  Knowledge: consistent with education and poor. Unable to perform simple calculations.   Able to name object. Unable to read. Able to repeat. Unable to write. Abnormal comprehension.     Cranial Nerves     CN II   Visual fields full to confrontation.   Visual acuity: normal  Right visual field deficit: none  Left visual field deficit: none     CN III, IV, VI   Right pupil: Shape: regular. Reactivity: brisk. Consensual response: intact.   Left pupil: Shape: regular. Reactivity: brisk. Consensual response: intact.   Nystagmus: none   Diplopia: none  Ophthalmoparesis: right abduction and left abduction  Upgaze: normal  Downgaze: normal  Conjugate gaze: absent  Vestibulo-ocular reflex: present    CN V   Facial sensation intact.   Right corneal reflex: normal  Left corneal reflex: normal    CN VII   Right facial weakness: none  Left facial weakness: none    CN VIII   Hearing: intact    CN IX, X   Palate: symmetric  Right gag reflex: normal  Left gag reflex: normal    CN XI   Right sternocleidomastoid strength: normal  Left sternocleidomastoid strength: normal    CN XII   Tongue: not atrophic  Fasciculations: absent  Tongue deviation: none    Motor Exam   Muscle bulk: normal  Overall muscle tone: decreased  Right arm tone: normal  Left arm tone: normal  Right leg tone: normal  Left leg tone: normal    Strength   Right neck flexion: 4/5  Left neck flexion: 4/5  Right neck extension: 4/5  Left neck extension: 4/5  Right deltoid: 4/5  Left deltoid: 4/5  Right biceps: 4/5  Left biceps: 4/5  Right triceps: 4/5  Left triceps: 4/5  Right wrist flexion: 4/5  Left wrist flexion: 4/5  Right wrist  extension: 4/5  Left wrist extension: 4/5  Right interossei: 4/5  Left interossei: 4/5  Right abdominals: 4/5  Left abdominals: 4/5  Right iliopsoas: 4/5  Left iliopsoas: 4/5  Right quadriceps: 4/5  Left quadriceps: 4/5  Right hamstrin/5  Left hamstrin/5  Right glutei: 4/5  Left glutei: 4/5  Right anterior tibial: 4/5  Left anterior tibial: 4/5  Right posterior tibial: 4/5  Left posterior tibial: 4/5  Right peroneal: 4/5  Left peroneal: 4/5  Right gastroc: 4/5  Left gastroc: 4/5    Sensory Exam   Light touch normal.   Vibration normal.   Proprioception normal.   Pinprick normal.     Gait, Coordination, and Reflexes     Gait  Gait: wide-based    Tremor   Resting tremor: absent  Intention tremor: absent  Action tremor: absent    Reflexes   Reflexes 2+ except as noted.       Physical Exam   Constitutional: He appears well-developed and well-nourished.   Nursing note and vitals reviewed.      Assessment/Plan:       Problems Addressed this Visit        Nervous and Auditory    Epilepsy (CMS/HCC) - Primary     Sz free on VPA and TPM          Relevant Medications    topiramate (TOPAMAX) 200 MG tablet    divalproex (DEPAKOTE) 500 MG DR tablet       Hematopoietic and Hemostatic    Thrombocytopenia (CMS/HCC)     Last value 158

## 2020-02-18 DIAGNOSIS — R33.9 URINARY RETENTION: ICD-10-CM

## 2020-02-21 RX ORDER — TAMSULOSIN HYDROCHLORIDE 0.4 MG/1
CAPSULE ORAL
Qty: 30 CAPSULE | Refills: 11 | Status: SHIPPED | OUTPATIENT
Start: 2020-02-21 | End: 2021-01-08 | Stop reason: SDUPTHER

## 2020-11-23 ENCOUNTER — OFFICE VISIT (OUTPATIENT)
Dept: NEUROLOGY | Facility: CLINIC | Age: 30
End: 2020-11-23

## 2020-11-23 DIAGNOSIS — G40.009 PARTIAL IDIOPATHIC EPILEPSY WITH SEIZURES OF LOCALIZED ONSET, NOT INTRACTABLE, WITHOUT STATUS EPILEPTICUS (HCC): Primary | ICD-10-CM

## 2020-11-23 PROCEDURE — 99441 PR PHYS/QHP TELEPHONE EVALUATION 5-10 MIN: CPT | Performed by: PSYCHIATRY & NEUROLOGY

## 2020-11-23 NOTE — PROGRESS NOTES
Subjective:     Patient consents to a telephone visit in follow up.     Time: 10 minutes         Patient ID: Alton Stallings is a 30 y.o. male.  Chief Complaint   Patient presents with   • Epilepsy     1yr follow up        History of Present Illness     30 y.o. male with Autism, childhood epilepsy returns in follow up.  Last visit on 11/25/19 continued VPA and TPM.      MRI Brain,  small vessel disease  EEG - normal    No intercurrent illness.     No sz activity.    Risperdal 1.5 BID  Buspar 10 mg TID   mg BID   mg BID       The following portions of the patient's history were reviewed and updated as appropriate: allergies, current medications, past family history, past medical history, past social history, past surgical history and problem list.    Review of Systems   Constitutional: Positive for fatigue. Negative for activity change and unexpected weight change.   HENT: Negative for facial swelling, hearing loss, tinnitus, trouble swallowing and voice change.    Eyes: Negative for photophobia and pain.   Respiratory: Negative for choking.    Gastrointestinal: Negative for constipation.   Endocrine: Negative for cold intolerance.   Genitourinary: Negative for difficulty urinating, frequency and urgency.   Musculoskeletal: Positive for gait problem. Negative for arthralgias, back pain, myalgias, neck pain and neck stiffness.   Skin: Negative for rash.   Allergic/Immunologic: Negative for immunocompromised state.   Neurological: Positive for syncope. Negative for dizziness, tremors, facial asymmetry, weakness, light-headedness and numbness.   Hematological: Negative for adenopathy.   Psychiatric/Behavioral: Positive for decreased concentration and dysphoric mood. Negative for hallucinations and sleep disturbance. The patient is not nervous/anxious.         Objective:     Neurologic Exam     Mental Status   Attention: decreased. Concentration: decreased.   Speech: speech is normal   Level of  consciousness: alert  Knowledge: poor.   Normal comprehension.       Physical Exam   Constitutional: He appears well-developed.   Psychiatric: His speech is normal.   Nursing note and vitals reviewed.      Assessment/Plan:       Problems Addressed this Visit        Nervous and Auditory    Epilepsy (CMS/HCC) - Primary     Sz free on TPM, VPA     Will get lab results from PCP           Diagnoses       Codes Comments    Partial idiopathic epilepsy with seizures of localized onset, not intractable, without status epilepticus (CMS/HCC)    -  Primary ICD-10-CM: G40.009  ICD-9-CM: 345.50

## 2021-01-08 ENCOUNTER — OFFICE VISIT (OUTPATIENT)
Dept: UROLOGY | Facility: CLINIC | Age: 31
End: 2021-01-08

## 2021-01-08 DIAGNOSIS — R33.9 URINARY RETENTION: Primary | ICD-10-CM

## 2021-01-08 DIAGNOSIS — N31.9 NEUROGENIC BLADDER: ICD-10-CM

## 2021-01-08 PROCEDURE — 99442 PR PHYS/QHP TELEPHONE EVALUATION 11-20 MIN: CPT | Performed by: UROLOGY

## 2021-01-08 RX ORDER — TAMSULOSIN HYDROCHLORIDE 0.4 MG/1
1 CAPSULE ORAL DAILY
Qty: 30 CAPSULE | Refills: 11 | Status: SHIPPED | OUTPATIENT
Start: 2021-01-08

## 2021-01-08 NOTE — PROGRESS NOTES
You have chosen to receive care through a telephone visit. Do you consent to use a telephone visit for your medical care today? YES   Chief Complaint  No chief complaint on file.    Neurogenic bladder    HPI  Chandrakant is a 31 y.o. male who returns today for follow-up by way of a telephone conversation.  Alton is mentally handicapped and my conversation was actually with Triny his caregiver with Alton on speaker phone.  She states he has actually been doing very well and has had no problems with bedwetting incontinence or complaints of voiding.  Previously he had complained of urinary frequency and was found to have a 3 ounce postvoid residual but is been voiding much better on Flomax.  I discussed the potential for postural hypotension with Triny so she can be on the look out for problems but he is currently doing great.    There were no vitals filed for this visit.    Past Medical History  Past Medical History:   Diagnosis Date   • Adjustment disorder with disturbance of conduct    • Intellectual disability        Past Surgical History  No past surgical history on file.    Medications  has a current medication list which includes the following prescription(s): buspirone, divalproex, loratadine, risperidone, tamsulosin, topiramate, trazodone, and trihexyphenidyl.      Allergies  Allergies   Allergen Reactions   • Ceclor [Cefaclor]    • Erythromycin    • Ritalin [Methylphenidate Hcl]        Social History  Social History     Socioeconomic History   • Marital status: Single     Spouse name: Not on file   • Number of children: Not on file   • Years of education: Not on file   • Highest education level: Not on file   Tobacco Use   • Smoking status: Never Smoker   • Smokeless tobacco: Never Used   Substance and Sexual Activity   • Alcohol use: No   • Drug use: No   • Sexual activity: Defer       Family History  He has no family history of bladder or kidney cancer  He has no family history of kidney stones      AUA Symptom  Score:      Review of Systems  Review of Systems    Physical Exam  Physical Exam    Labs Recent and today in the office:  Results for orders placed or performed in visit on 10/16/19   Comprehensive Metabolic Panel    Specimen: Blood   Result Value Ref Range    Glucose 99 65 - 99 mg/dL    BUN 9 6 - 20 mg/dL    Creatinine 0.91 0.76 - 1.27 mg/dL    Sodium 139 136 - 145 mmol/L    Potassium 4.1 3.5 - 5.2 mmol/L    Chloride 104 98 - 107 mmol/L    CO2 23.2 22.0 - 29.0 mmol/L    Calcium 9.2 8.6 - 10.5 mg/dL    Total Protein 6.8 6.0 - 8.5 g/dL    Albumin 3.80 3.50 - 5.20 g/dL    ALT (SGPT) 11 1 - 41 U/L    AST (SGOT) 12 1 - 40 U/L    Alkaline Phosphatase 38 (L) 39 - 117 U/L    Total Bilirubin 0.5 0.2 - 1.2 mg/dL    eGFR Non African Amer 99 >60 mL/min/1.73    Globulin 3.0 gm/dL    A/G Ratio 1.3 g/dL    BUN/Creatinine Ratio 9.9 7.0 - 25.0    Anion Gap 11.8 5.0 - 15.0 mmol/L   Hemoglobin A1c    Specimen: Blood   Result Value Ref Range    Hemoglobin A1C 5.93 (H) 4.80 - 5.60 %   Lipid Panel    Specimen: Blood   Result Value Ref Range    Total Cholesterol 154 0 - 200 mg/dL    Triglycerides 140 0 - 150 mg/dL    HDL Cholesterol 39 (L) 40 - 60 mg/dL    LDL Cholesterol  87 0 - 100 mg/dL    VLDL Cholesterol 28 5 - 40 mg/dL    LDL/HDL Ratio 2.23    Prolactin    Specimen: Blood   Result Value Ref Range    Prolactin 58.90 (H) 4.04 - 15.20 ng/mL   Valproic Acid Level, Total    Specimen: Blood   Result Value Ref Range    Valproic Acid 67.0 50.0 - 125.0 mcg/mL   CBC Auto Differential    Specimen: Blood   Result Value Ref Range    WBC 7.56 3.40 - 10.80 10*3/mm3    RBC 4.69 4.14 - 5.80 10*6/mm3    Hemoglobin 14.1 13.0 - 17.7 g/dL    Hematocrit 42.4 37.5 - 51.0 %    MCV 90.4 79.0 - 97.0 fL    MCH 30.1 26.6 - 33.0 pg    MCHC 33.3 31.5 - 35.7 g/dL    RDW 12.6 12.3 - 15.4 %    RDW-SD 41.4 37.0 - 54.0 fl    MPV 10.9 6.0 - 12.0 fL    Platelets 158 140 - 450 10*3/mm3    Neutrophil % 53.8 42.7 - 76.0 %    Lymphocyte % 26.9 19.6 - 45.3 %    Monocyte %  11.9 5.0 - 12.0 %    Eosinophil % 6.1 0.3 - 6.2 %    Basophil % 0.8 0.0 - 1.5 %    Immature Grans % 0.5 0.0 - 0.5 %    Neutrophils, Absolute 4.07 1.70 - 7.00 10*3/mm3    Lymphocytes, Absolute 2.03 0.70 - 3.10 10*3/mm3    Monocytes, Absolute 0.90 0.10 - 0.90 10*3/mm3    Eosinophils, Absolute 0.46 (H) 0.00 - 0.40 10*3/mm3    Basophils, Absolute 0.06 0.00 - 0.20 10*3/mm3    Immature Grans, Absolute 0.04 0.00 - 0.05 10*3/mm3    nRBC 0.0 0.0 - 0.2 /100 WBC         Assessment & Plan  Neurogenic bladder: Is mentally handicapped young man with a seizure disorder is known to carry several ounces of postvoid residual but has responded well to daily Flomax.  His care giver is encouraged to bring in a specimen so we can check it for infection anytime there is a problem.  Greater than 11 minutes was spent on the telephone with this conversation.

## 2021-04-20 ENCOUNTER — TRANSCRIBE ORDERS (OUTPATIENT)
Dept: LAB | Facility: HOSPITAL | Age: 31
End: 2021-04-20

## 2021-04-20 ENCOUNTER — LAB (OUTPATIENT)
Dept: LAB | Facility: HOSPITAL | Age: 31
End: 2021-04-20

## 2021-04-20 DIAGNOSIS — Z00.00 ROUTINE GENERAL MEDICAL EXAMINATION AT A HEALTH CARE FACILITY: Primary | ICD-10-CM

## 2021-04-20 DIAGNOSIS — Z00.00 ROUTINE GENERAL MEDICAL EXAMINATION AT A HEALTH CARE FACILITY: ICD-10-CM

## 2021-04-20 LAB
25(OH)D3 SERPL-MCNC: 31.8 NG/ML
ALBUMIN SERPL-MCNC: 3.9 G/DL (ref 3.5–5.2)
ALBUMIN/GLOB SERPL: 2 G/DL
ALP SERPL-CCNC: 35 U/L (ref 39–117)
ALT SERPL W P-5'-P-CCNC: 16 U/L (ref 1–41)
ANION GAP SERPL CALCULATED.3IONS-SCNC: 7.5 MMOL/L (ref 5–15)
AST SERPL-CCNC: 18 U/L (ref 1–40)
BILIRUB SERPL-MCNC: 0.4 MG/DL (ref 0–1.2)
BUN SERPL-MCNC: 12 MG/DL (ref 6–20)
BUN/CREAT SERPL: 13 (ref 7–25)
CALCIUM SPEC-SCNC: 9.1 MG/DL (ref 8.6–10.5)
CHLORIDE SERPL-SCNC: 108 MMOL/L (ref 98–107)
CHOLEST SERPL-MCNC: 161 MG/DL (ref 0–200)
CO2 SERPL-SCNC: 25.5 MMOL/L (ref 22–29)
CREAT SERPL-MCNC: 0.92 MG/DL (ref 0.76–1.27)
GFR SERPL CREATININE-BSD FRML MDRD: 96 ML/MIN/1.73
GLOBULIN UR ELPH-MCNC: 2 GM/DL
GLUCOSE SERPL-MCNC: 90 MG/DL (ref 65–99)
HBA1C MFR BLD: 5.05 % (ref 4.8–5.6)
HDLC SERPL-MCNC: 46 MG/DL (ref 40–60)
LDLC SERPL CALC-MCNC: 94 MG/DL (ref 0–100)
LDLC/HDLC SERPL: 1.99 {RATIO}
POTASSIUM SERPL-SCNC: 4.2 MMOL/L (ref 3.5–5.2)
PROLACTIN SERPL-MCNC: 77 NG/ML (ref 4.04–15.2)
PROT SERPL-MCNC: 5.9 G/DL (ref 6–8.5)
SODIUM SERPL-SCNC: 141 MMOL/L (ref 136–145)
TRIGL SERPL-MCNC: 117 MG/DL (ref 0–150)
VALPROATE SERPL-MCNC: 69 MCG/ML (ref 50–125)
VLDLC SERPL-MCNC: 21 MG/DL (ref 5–40)

## 2021-04-20 PROCEDURE — 80061 LIPID PANEL: CPT

## 2021-04-20 PROCEDURE — 83036 HEMOGLOBIN GLYCOSYLATED A1C: CPT

## 2021-04-20 PROCEDURE — 84146 ASSAY OF PROLACTIN: CPT

## 2021-04-20 PROCEDURE — 36415 COLL VENOUS BLD VENIPUNCTURE: CPT

## 2021-04-20 PROCEDURE — 80164 ASSAY DIPROPYLACETIC ACD TOT: CPT

## 2021-04-20 PROCEDURE — 80053 COMPREHEN METABOLIC PANEL: CPT

## 2021-04-20 PROCEDURE — 82306 VITAMIN D 25 HYDROXY: CPT

## 2021-11-23 ENCOUNTER — LAB (OUTPATIENT)
Dept: LAB | Facility: HOSPITAL | Age: 31
End: 2021-11-23

## 2021-11-23 ENCOUNTER — OFFICE VISIT (OUTPATIENT)
Dept: NEUROLOGY | Facility: CLINIC | Age: 31
End: 2021-11-23

## 2021-11-23 VITALS
TEMPERATURE: 97.3 F | BODY MASS INDEX: 29.1 KG/M2 | WEIGHT: 192 LBS | DIASTOLIC BLOOD PRESSURE: 78 MMHG | HEART RATE: 106 BPM | OXYGEN SATURATION: 97 % | SYSTOLIC BLOOD PRESSURE: 110 MMHG | HEIGHT: 68 IN

## 2021-11-23 DIAGNOSIS — G40.009 PARTIAL IDIOPATHIC EPILEPSY WITH SEIZURES OF LOCALIZED ONSET, NOT INTRACTABLE, WITHOUT STATUS EPILEPTICUS (HCC): ICD-10-CM

## 2021-11-23 DIAGNOSIS — G40.009 PARTIAL IDIOPATHIC EPILEPSY WITH SEIZURES OF LOCALIZED ONSET, NOT INTRACTABLE, WITHOUT STATUS EPILEPTICUS (HCC): Primary | ICD-10-CM

## 2021-11-23 LAB
ALBUMIN SERPL-MCNC: 3.8 G/DL (ref 3.5–5.2)
ALBUMIN/GLOB SERPL: 1.6 G/DL
ALP SERPL-CCNC: 35 U/L (ref 39–117)
ALT SERPL W P-5'-P-CCNC: 13 U/L (ref 1–41)
ANION GAP SERPL CALCULATED.3IONS-SCNC: 10 MMOL/L (ref 5–15)
AST SERPL-CCNC: 18 U/L (ref 1–40)
BASOPHILS # BLD AUTO: 0.04 10*3/MM3 (ref 0–0.2)
BASOPHILS NFR BLD AUTO: 0.6 % (ref 0–1.5)
BILIRUB SERPL-MCNC: 0.2 MG/DL (ref 0–1.2)
BUN SERPL-MCNC: 12 MG/DL (ref 6–20)
BUN/CREAT SERPL: 14.1 (ref 7–25)
CALCIUM SPEC-SCNC: 9.2 MG/DL (ref 8.6–10.5)
CHLORIDE SERPL-SCNC: 110 MMOL/L (ref 98–107)
CO2 SERPL-SCNC: 22 MMOL/L (ref 22–29)
CREAT SERPL-MCNC: 0.85 MG/DL (ref 0.76–1.27)
DEPRECATED RDW RBC AUTO: 39.8 FL (ref 37–54)
EOSINOPHIL # BLD AUTO: 0.26 10*3/MM3 (ref 0–0.4)
EOSINOPHIL NFR BLD AUTO: 3.8 % (ref 0.3–6.2)
ERYTHROCYTE [DISTWIDTH] IN BLOOD BY AUTOMATED COUNT: 11.8 % (ref 12.3–15.4)
GFR SERPL CREATININE-BSD FRML MDRD: 105 ML/MIN/1.73
GLOBULIN UR ELPH-MCNC: 2.4 GM/DL
GLUCOSE SERPL-MCNC: 73 MG/DL (ref 65–99)
HCT VFR BLD AUTO: 45.1 % (ref 37.5–51)
HGB BLD-MCNC: 14.5 G/DL (ref 13–17.7)
IMM GRANULOCYTES # BLD AUTO: 0.03 10*3/MM3 (ref 0–0.05)
IMM GRANULOCYTES NFR BLD AUTO: 0.4 % (ref 0–0.5)
LYMPHOCYTES # BLD AUTO: 2.95 10*3/MM3 (ref 0.7–3.1)
LYMPHOCYTES NFR BLD AUTO: 43.3 % (ref 19.6–45.3)
MCH RBC QN AUTO: 29.8 PG (ref 26.6–33)
MCHC RBC AUTO-ENTMCNC: 32.2 G/DL (ref 31.5–35.7)
MCV RBC AUTO: 92.8 FL (ref 79–97)
MONOCYTES # BLD AUTO: 0.8 10*3/MM3 (ref 0.1–0.9)
MONOCYTES NFR BLD AUTO: 11.7 % (ref 5–12)
NEUTROPHILS NFR BLD AUTO: 2.73 10*3/MM3 (ref 1.7–7)
NEUTROPHILS NFR BLD AUTO: 40.2 % (ref 42.7–76)
NRBC BLD AUTO-RTO: 0 /100 WBC (ref 0–0.2)
PLATELET # BLD AUTO: 176 10*3/MM3 (ref 140–450)
PMV BLD AUTO: 10.5 FL (ref 6–12)
POTASSIUM SERPL-SCNC: 3.7 MMOL/L (ref 3.5–5.2)
PROT SERPL-MCNC: 6.2 G/DL (ref 6–8.5)
RBC # BLD AUTO: 4.86 10*6/MM3 (ref 4.14–5.8)
SODIUM SERPL-SCNC: 142 MMOL/L (ref 136–145)
WBC NRBC COR # BLD: 6.81 10*3/MM3 (ref 3.4–10.8)

## 2021-11-23 PROCEDURE — 85025 COMPLETE CBC W/AUTO DIFF WBC: CPT

## 2021-11-23 PROCEDURE — 99213 OFFICE O/P EST LOW 20 MIN: CPT | Performed by: NURSE PRACTITIONER

## 2021-11-23 PROCEDURE — 36415 COLL VENOUS BLD VENIPUNCTURE: CPT

## 2021-11-23 PROCEDURE — 80053 COMPREHEN METABOLIC PANEL: CPT

## 2021-11-23 RX ORDER — BUSPIRONE HYDROCHLORIDE 15 MG/1
15 TABLET ORAL 3 TIMES DAILY
COMMUNITY
Start: 2021-11-18

## 2021-11-23 NOTE — PROGRESS NOTES
Subjective:     Patient ID: Alton Stallings is a 31 y.o. male.    CC:   Chief Complaint   Patient presents with   • Partial Idiopathic Epilepsy with Seizures     1 year follow up        HPI:   History of Present Illness   31 y.o. male with Autism, childhood epilepsy returns in follow up.  Last visit on 11/23/20 continued VPA and TPM.      Labs 4/20/21: CMP, VPA - NCS      MRI Brain,  small vessel disease  EEG - normal     No illness over the past year, has had 3 COVID vaccinations.      No sz activity.     Risperdal 3 BID  Buspar 15 mg TID   mg BID   mg BID   Scripts managed by MD at facility     The following portions of the patient's history were reviewed and updated as appropriate: allergies, current medications, past family history, past medical history, past social history, past surgical history and problem list.    Past Medical History:   Diagnosis Date   • Adjustment disorder with disturbance of conduct    • Intellectual disability        History reviewed. No pertinent surgical history.    Social History     Socioeconomic History   • Marital status: Single   Tobacco Use   • Smoking status: Never Smoker   • Smokeless tobacco: Never Used   Vaping Use   • Vaping Use: Never used   Substance and Sexual Activity   • Alcohol use: No   • Drug use: No   • Sexual activity: Defer       Family History   Problem Relation Age of Onset   • No Known Problems Mother    • No Known Problems Father    • No Known Problems Sister    • No Known Problems Brother    • No Known Problems Maternal Aunt    • No Known Problems Maternal Uncle    • No Known Problems Paternal Aunt    • No Known Problems Paternal Uncle    • No Known Problems Maternal Grandmother    • No Known Problems Maternal Grandfather    • No Known Problems Paternal Grandmother    • No Known Problems Paternal Grandfather    • No Known Problems Other    • Ataxia Neg Hx    • Chorea Neg Hx    • Dementia Neg Hx    • Mental retardation Neg Hx    • Migraines Neg  "Hx    • Multiple sclerosis Neg Hx    • Neurofibromatosis Neg Hx    • Neuropathy Neg Hx    • Parkinsonism Neg Hx    • Seizures Neg Hx    • Stroke Neg Hx         Objective:  /78   Pulse 106   Temp 97.3 °F (36.3 °C)   Ht 172.7 cm (68\")   Wt 87.1 kg (192 lb)   SpO2 97%   BMI 29.19 kg/m²     Neurologic Exam     Mental Status   Oriented to person.   Speech: speech is normal   Level of consciousness: alert  Knowledge: poor.   Abnormal comprehension.     Cranial Nerves     CN II   Visual fields full to confrontation.   Visual acuity: normal  Right visual field deficit: none  Left visual field deficit: none     CN III, IV, VI   Pupils are equal, round, and reactive to light.  Extraocular motions are normal.   Right pupil: Shape: regular. Reactivity: brisk. Consensual response: intact.   Left pupil: Shape: regular. Reactivity: brisk. Consensual response: intact.   Nystagmus: none   Diplopia: none  Ophthalmoparesis: none  Upgaze: normal  Downgaze: normal  Conjugate gaze: present  Vestibulo-ocular reflex: present    CN V   Facial sensation intact.   Right corneal reflex: normal  Left corneal reflex: normal    CN VII   Right facial weakness: none  Left facial weakness: none    CN VIII   Hearing: intact    CN IX, X   Palate: symmetric  Right gag reflex: normal  Left gag reflex: normal    CN XI   Right sternocleidomastoid strength: normal  Left sternocleidomastoid strength: normal    CN XII   Tongue: not atrophic  Fasciculations: absent  Tongue deviation: none    Motor Exam   Muscle bulk: normal  Overall muscle tone: normal    Strength   Strength 5/5 throughout.     Gait, Coordination, and Reflexes     Gait  Gait: normal    Tremor   Resting tremor: present (mild BUE )  Intention tremor: absent  Action tremor: absent    Reflexes   Reflexes 2+ except as noted.   Right patellar: 3+  Left patellar: 3+      Physical Exam  Vitals and nursing note reviewed.   Constitutional:       Appearance: Normal appearance.   HENT:      " Head: Normocephalic and atraumatic.   Eyes:      Extraocular Movements: EOM normal.      Pupils: Pupils are equal, round, and reactive to light.   Skin:     General: Skin is warm and dry.   Neurological:      Mental Status: He is alert.      Gait: Gait is intact.      Deep Tendon Reflexes: Strength normal.      Reflex Scores:       Patellar reflexes are 3+ on the right side and 3+ on the left side.  Psychiatric:         Mood and Affect: Mood normal.         Speech: Speech normal.         Assessment/Plan:       Diagnoses and all orders for this visit:    1. Partial idiopathic epilepsy with seizures of localized onset, not intractable, without status epilepticus (HCC) (Primary)  Assessment & Plan:  Stable on TPM and VPA     Ordered CBC, CMP     F/U in 1 year or sooner if needed     Orders:  -     CBC & Differential; Future  -     Comprehensive Metabolic Panel; Future           Reviewed medications, potential side effects and signs and symptoms to report. Discussed risk versus benefits of treatment plan with patient and/or family-including medications, labs and radiology that may be ordered. Addressed questions and concerns during visit. Patient and/or family verbalized understanding and agree with plan. Patient instructed to call the office with questions or concerns and report to ED with life-threatening symptoms.     AS THE PROVIDER, I PERSONALLY WORE PPE DURING ENTIRE FACE TO FACE ENCOUNTER IN CLINIC WITH THE PATIENT. PATIENT ALSO WORE PPE DURING ENTIRE FACE TO FACE ENCOUNTER EXCEPT FOR A MAX OF 30 SECONDS DURING NEUROLOGICAL EVALUATION OF CRANIAL NERVES AND THEN MASK WAS PLACED BACK OVER PATIENT FACE FOR REMAINDER OF VISIT. I WASHED MY HANDS BEFORE AND AFTER VISIT.    During this visit the following were done:  Labs Reviewed []    Labs Ordered []    Radiology Reports Reviewed []    Radiology Ordered []    PCP Records Reviewed []    Referring Provider Records Reviewed []    ER Records Reviewed []    Hospital  Records Reviewed []    History Obtained From Family []    Radiology Images Reviewed []    Other Reviewed []    Records Requested []      Return in about 1 year (around 11/23/2022).      Najma Richardson, BAY  11/23/2021

## 2021-11-29 ENCOUNTER — TELEPHONE (OUTPATIENT)
Dept: NEUROLOGY | Facility: CLINIC | Age: 31
End: 2021-11-29

## 2021-11-29 NOTE — TELEPHONE ENCOUNTER
----- Message from BAY Ray sent at 11/29/2021 11:53 AM EST -----  Please let Alton know that his blood counts and liver and kidney function is normal.   Thanks!

## 2022-08-05 ENCOUNTER — TRANSCRIBE ORDERS (OUTPATIENT)
Dept: LAB | Facility: HOSPITAL | Age: 32
End: 2022-08-05

## 2022-08-05 ENCOUNTER — LAB (OUTPATIENT)
Dept: LAB | Facility: HOSPITAL | Age: 32
End: 2022-08-05

## 2022-08-05 DIAGNOSIS — Z79.899 ENCOUNTER FOR LONG-TERM (CURRENT) USE OF OTHER MEDICATIONS: Primary | ICD-10-CM

## 2022-08-05 DIAGNOSIS — Z79.899 ENCOUNTER FOR LONG-TERM (CURRENT) USE OF OTHER MEDICATIONS: ICD-10-CM

## 2022-08-05 LAB
ALBUMIN SERPL-MCNC: 4 G/DL (ref 3.5–5.2)
ALBUMIN/GLOB SERPL: 1.9 G/DL
ALP SERPL-CCNC: 32 U/L (ref 39–117)
ALT SERPL W P-5'-P-CCNC: 13 U/L (ref 1–41)
ANION GAP SERPL CALCULATED.3IONS-SCNC: 9.2 MMOL/L (ref 5–15)
AST SERPL-CCNC: 14 U/L (ref 1–40)
BILIRUB SERPL-MCNC: 0.3 MG/DL (ref 0–1.2)
BUN SERPL-MCNC: 12 MG/DL (ref 6–20)
BUN/CREAT SERPL: 12.9 (ref 7–25)
CALCIUM SPEC-SCNC: 9.1 MG/DL (ref 8.6–10.5)
CHLORIDE SERPL-SCNC: 110 MMOL/L (ref 98–107)
CHOLEST SERPL-MCNC: 184 MG/DL (ref 0–200)
CO2 SERPL-SCNC: 23.8 MMOL/L (ref 22–29)
CREAT SERPL-MCNC: 0.93 MG/DL (ref 0.76–1.27)
EGFRCR SERPLBLD CKD-EPI 2021: 111.9 ML/MIN/1.73
GLOBULIN UR ELPH-MCNC: 2.1 GM/DL
GLUCOSE SERPL-MCNC: 88 MG/DL (ref 65–99)
HBA1C MFR BLD: 5.4 % (ref 4.8–5.6)
HDLC SERPL-MCNC: 49 MG/DL (ref 40–60)
LDLC SERPL CALC-MCNC: 115 MG/DL (ref 0–100)
LDLC/HDLC SERPL: 2.3 {RATIO}
POTASSIUM SERPL-SCNC: 4.1 MMOL/L (ref 3.5–5.2)
PROLACTIN SERPL-MCNC: 67.4 NG/ML (ref 4.04–15.2)
PROT SERPL-MCNC: 6.1 G/DL (ref 6–8.5)
SODIUM SERPL-SCNC: 143 MMOL/L (ref 136–145)
TRIGL SERPL-MCNC: 112 MG/DL (ref 0–150)
VALPROATE SERPL-MCNC: 72 MCG/ML (ref 50–125)
VLDLC SERPL-MCNC: 20 MG/DL (ref 5–40)

## 2022-08-05 PROCEDURE — 80053 COMPREHEN METABOLIC PANEL: CPT

## 2022-08-05 PROCEDURE — 84146 ASSAY OF PROLACTIN: CPT

## 2022-08-05 PROCEDURE — 80061 LIPID PANEL: CPT

## 2022-08-05 PROCEDURE — 83036 HEMOGLOBIN GLYCOSYLATED A1C: CPT

## 2022-08-05 PROCEDURE — 36415 COLL VENOUS BLD VENIPUNCTURE: CPT

## 2022-08-05 PROCEDURE — 80164 ASSAY DIPROPYLACETIC ACD TOT: CPT

## 2022-11-16 ENCOUNTER — OFFICE VISIT (OUTPATIENT)
Dept: NEUROLOGY | Facility: CLINIC | Age: 32
End: 2022-11-16

## 2022-11-16 VITALS
DIASTOLIC BLOOD PRESSURE: 74 MMHG | SYSTOLIC BLOOD PRESSURE: 116 MMHG | TEMPERATURE: 97.3 F | BODY MASS INDEX: 30.95 KG/M2 | WEIGHT: 204.2 LBS | HEART RATE: 92 BPM | HEIGHT: 68 IN | OXYGEN SATURATION: 96 %

## 2022-11-16 DIAGNOSIS — G40.009 PARTIAL IDIOPATHIC EPILEPSY WITH SEIZURES OF LOCALIZED ONSET, NOT INTRACTABLE, WITHOUT STATUS EPILEPTICUS: Primary | ICD-10-CM

## 2022-11-16 DIAGNOSIS — R25.1 TREMOR: ICD-10-CM

## 2022-11-16 PROBLEM — N40.0 BENIGN PROSTATIC HYPERPLASIA WITHOUT URINARY OBSTRUCTION: Status: ACTIVE | Noted: 2022-11-16

## 2022-11-16 PROBLEM — E66.9 OBESITY WITH BODY MASS INDEX 30 OR GREATER: Status: ACTIVE | Noted: 2022-11-16

## 2022-11-16 PROBLEM — G47.00 INSOMNIA: Status: ACTIVE | Noted: 2022-11-16

## 2022-11-16 PROBLEM — F43.22 ADJUSTMENT DISORDER WITH ANXIETY: Status: ACTIVE | Noted: 2022-11-16

## 2022-11-16 PROBLEM — E66.9 CLASS 2 OBESITY: Status: ACTIVE | Noted: 2022-11-16

## 2022-11-16 PROBLEM — J30.9 ALLERGIC RHINITIS: Status: ACTIVE | Noted: 2022-11-16

## 2022-11-16 PROBLEM — F89 DISORDER OF PSYCHOLOGICAL DEVELOPMENT: Status: ACTIVE | Noted: 2022-11-16

## 2022-11-16 PROBLEM — R41.82 ALTERED MENTAL STATUS: Status: ACTIVE | Noted: 2022-11-16

## 2022-11-16 PROBLEM — E66.812 CLASS 2 OBESITY: Status: ACTIVE | Noted: 2022-11-16

## 2022-11-16 PROBLEM — F41.9 ANXIETY DISORDER: Status: ACTIVE | Noted: 2022-11-16

## 2022-11-16 PROBLEM — I95.9 HYPOTENSION: Status: ACTIVE | Noted: 2022-11-16

## 2022-11-16 PROCEDURE — 99214 OFFICE O/P EST MOD 30 MIN: CPT | Performed by: NURSE PRACTITIONER

## 2022-11-16 RX ORDER — HALOPERIDOL 0.5 MG/1
TABLET ORAL
COMMUNITY
Start: 2022-10-21

## 2022-11-16 NOTE — PROGRESS NOTES
Neuro Office Visit      Encounter Date: 2022   Patient Name: Alton Stallings  : 1990   MRN: [id number]   PCP: BAY Rincon  Chief Complaint:    Chief Complaint   Patient presents with   • Seizures       History of Present Illness: Alton Stallings is a 32 y.o. male who is here today in Neurology with his cargiver for seizures.    Last visit 21 w BAY Jain-cont TPM, VPA.    Seizures  Doing well. No complaints. Staff report good appetite and restful sleep. No behavior issues. Denies falling out of bed and tongue biting.  Medication:Risperdal 3 bid,  bid,  bid-managed by MD at facility  Compliance:yes  Side effects:none known, pt unable to report  Last seizure:witnessed at 10 yo. Staff reported some falls and slow to respond in 2016  Aura:unable to report    A1c 5.4 22  VPA 72.0    PH  Previously managed by Dr Tamayo  Psychiatry treats w VPA and TPM for agitation  MRI Brain,  small vessel disease  EEG - normal    Tremor  Left hand tremor is new. This caregiver is new to him and doesn't know if this is constant or only visible today with anxiety.    PMH: autism  SH: Lives in [place] at [facility]  Subjective      Past Medical History:   Past Medical History:   Diagnosis Date   • Adjustment disorder with disturbance of conduct    • Intellectual disability        Past Surgical History: History reviewed. No pertinent surgical history.    Family History:   Family History   Problem Relation Age of Onset   • No Known Problems Mother    • No Known Problems Father    • No Known Problems Sister    • No Known Problems Brother    • No Known Problems Maternal Aunt    • No Known Problems Maternal Uncle    • No Known Problems Paternal Aunt    • No Known Problems Paternal Uncle    • No Known Problems Maternal Grandmother    • No Known Problems Maternal Grandfather    • No Known Problems Paternal Grandmother    • No Known Problems Paternal Grandfather    •  No Known Problems Other    • Ataxia Neg Hx    • Chorea Neg Hx    • Dementia Neg Hx    • Mental retardation Neg Hx    • Migraines Neg Hx    • Multiple sclerosis Neg Hx    • Neurofibromatosis Neg Hx    • Neuropathy Neg Hx    • Parkinsonism Neg Hx    • Seizures Neg Hx    • Stroke Neg Hx        Social History:   Social History     Socioeconomic History   • Marital status: Single   Tobacco Use   • Smoking status: Never   • Smokeless tobacco: Never   Vaping Use   • Vaping Use: Never used   Substance and Sexual Activity   • Alcohol use: No   • Drug use: No   • Sexual activity: Defer       Medications:     Current Outpatient Medications:   •  busPIRone (BUSPAR) 15 MG tablet, Take 15 mg by mouth 3 (Three) Times a Day., Disp: , Rfl:   •  divalproex (DEPAKOTE) 500 MG DR tablet, 2 (Two) Times a Day., Disp: , Rfl: 1  •  loratadine (CLARITIN) 10 MG tablet, Take 10 mg by mouth daily., Disp: , Rfl:   •  risperiDONE (RisperDAL) 3 MG tablet, Take 3 mg by mouth 2 (two) times a day., Disp: , Rfl:   •  tamsulosin (FLOMAX) 0.4 MG capsule 24 hr capsule, Take 1 capsule by mouth Daily., Disp: 30 capsule, Rfl: 11  •  topiramate (TOPAMAX) 200 MG tablet, 2 (Two) Times a Day., Disp: , Rfl: 2  •  traZODone (DESYREL) 150 MG tablet, , Disp: , Rfl: 2  •  trihexyphenidyl (ARTANE) 2 MG tablet, Take 2 mg by mouth 3 (three) times a day with meals., Disp: , Rfl:   •  haloperidol (HALDOL) 0.5 MG tablet, TAKE ONE TABLET BY MOUTH 3 TIMES DAILY (MUST LAST 28 DAYS), Disp: , Rfl:     Allergies:   Allergies   Allergen Reactions   • Ceclor [Cefaclor]    • Erythromycin    • Ritalin [Methylphenidate Hcl]        PHQ-9 Total Score:     [facility] Fall Risk Assessment has not been completed.    Objective     Physical Exam:   Physical Exam  Eyes:      Pupils: Pupils are equal, round, and reactive to light.   Neurological:      Gait: Gait is intact.      Deep Tendon Reflexes:      Reflex Scores:       Tricep reflexes are 2+ on the right side and 2+ on the left side.        Bicep reflexes are 2+ on the right side and 2+ on the left side.       Brachioradialis reflexes are 2+ on the right side and 2+ on the left side.       Patellar reflexes are 2+ on the right side and 2+ on the left side.       Achilles reflexes are 2+ on the right side and 2+ on the left side.  Psychiatric:         Speech: Speech normal.         Neurologic Exam     Mental Status   Disoriented to person.   Concentration: decreased.   Speech: speech is normal   Level of consciousness: alert  Knowledge: poor.   Abnormal comprehension.     Cranial Nerves     CN III, IV, VI   Pupils are equal, round, and reactive to light.    Motor Exam     Strength   Right neck flexion: 4/5  Left neck flexion: 4/5  Right neck extension: 4/5  Left neck extension: 4/5  Right deltoid: 4/5  Left deltoid: 4/5  Right biceps: 4/5  Left biceps: 4/5  Right triceps: 4/5  Left triceps: 4/5  Right wrist flexion: 4/5  Left wrist flexion: 4/5  Right wrist extension: 4/5  Left wrist extension: 4/5  Right interossei: 4/5  Left interossei: 4/5  Right abdominals: 4/5  Left abdominals: 4/5  Right iliopsoas: 4/5  Left iliopsoas: 4/5  Right quadriceps: 4/5  Left quadriceps: 4/5  Right hamstrin/5  Left hamstrin/5  Right glutei: 4/5  Left glutei: 4/5  Right anterior tibial: 4/5  Left anterior tibial: 4/5  Right posterior tibial: 4/5  Left posterior tibial: 4/5  Right peroneal: 4/5  Left peroneal: 4/5  Right gastroc: 4/5  Left gastroc: 4/5    Gait, Coordination, and Reflexes     Gait  Gait: normal    Reflexes   Right brachioradialis: 2+  Left brachioradialis: 2+  Right biceps: 2+  Left biceps: 2+  Right triceps: 2+  Left triceps: 2+  Right patellar: 2+  Left patellar: 2+  Right achilles: 2+  Left achilles: 2+  Right : 2+  Left : 2+Unable to follow commands for FNF. bialt UE and LE int tremor. Worse in LUE.        Vital Signs:   Vitals:    22 1303   BP: 116/74   Pulse: 92   Temp: 97.3 °F (36.3 °C)   SpO2: 96%   Weight: 92.6 kg (204 lb 3.2  "oz)   Height: 172.7 cm (67.99\")     Body mass index is 31.06 kg/m².         Assessment / Plan      Assessment/Plan:   Diagnoses and all orders for this visit:    1. Partial idiopathic epilepsy with seizures of localized onset, not intractable, without status epilepticus (HCC) (Primary)  Comments:  Cont TPM 200bid, VPA . Also on risperdal, desyrel, artane and haldol    2. Tremor  Comments:  Will observe. Staff to call if tremor is persistent or interfere with ADL's           Patient Education:       Reviewed medications, potential side effects and signs and symptoms to report. Discussed risk versus benefits of treatment plan with patient and/or family-including medications, labs and radiology that may be ordered. Addressed questions and concerns during visit. Patient and/or family verbalized understanding and agree with plan. Instructed to call the office with any questions and report to ER with any life-threatening symptoms.     Follow Up:   Return in about 1 year (around 11/16/2023) for Recheck.    During this visit the following were done:  Labs Reviewed [x]    Labs Ordered []    Radiology Reports Reviewed []    Radiology Ordered []    PCP Records Reviewed []    Referring Provider Records Reviewed []    ER Records Reviewed []    Hospital Records Reviewed []    History Obtained From Family [x]    Radiology Images Reviewed []    Other Reviewed [x]    Records Requested []      Deandre Fisher, ALVARO, APRN  "

## 2023-06-06 PROCEDURE — 81015 MICROSCOPIC EXAM OF URINE: CPT | Performed by: NURSE PRACTITIONER

## 2023-06-06 PROCEDURE — 81003 URINALYSIS AUTO W/O SCOPE: CPT | Performed by: NURSE PRACTITIONER

## 2023-06-08 ENCOUNTER — TRANSCRIBE ORDERS (OUTPATIENT)
Dept: LAB | Facility: HOSPITAL | Age: 33
End: 2023-06-08
Payer: MEDICAID

## 2023-06-08 DIAGNOSIS — R41.82 ALTERED MENTAL STATUS, UNSPECIFIED ALTERED MENTAL STATUS TYPE: Primary | ICD-10-CM

## 2023-11-16 ENCOUNTER — OFFICE VISIT (OUTPATIENT)
Dept: NEUROLOGY | Facility: CLINIC | Age: 33
End: 2023-11-16
Payer: MEDICAID

## 2023-11-16 ENCOUNTER — LAB (OUTPATIENT)
Dept: LAB | Facility: HOSPITAL | Age: 33
End: 2023-11-16
Payer: MEDICAID

## 2023-11-16 VITALS
SYSTOLIC BLOOD PRESSURE: 92 MMHG | BODY MASS INDEX: 29.13 KG/M2 | DIASTOLIC BLOOD PRESSURE: 74 MMHG | WEIGHT: 192.2 LBS | HEIGHT: 68 IN | OXYGEN SATURATION: 98 % | HEART RATE: 92 BPM

## 2023-11-16 DIAGNOSIS — G40.009 PARTIAL IDIOPATHIC EPILEPSY WITH SEIZURES OF LOCALIZED ONSET, NOT INTRACTABLE, WITHOUT STATUS EPILEPTICUS: Primary | ICD-10-CM

## 2023-11-16 DIAGNOSIS — G40.009 PARTIAL IDIOPATHIC EPILEPSY WITH SEIZURES OF LOCALIZED ONSET, NOT INTRACTABLE, WITHOUT STATUS EPILEPTICUS: ICD-10-CM

## 2023-11-16 DIAGNOSIS — R25.1 TREMOR: ICD-10-CM

## 2023-11-16 LAB
ALBUMIN SERPL-MCNC: 4.4 G/DL (ref 3.5–5.2)
ALBUMIN/GLOB SERPL: 2.3 G/DL
ALP SERPL-CCNC: 39 U/L (ref 39–117)
ALT SERPL W P-5'-P-CCNC: 16 U/L (ref 1–41)
ANION GAP SERPL CALCULATED.3IONS-SCNC: 10 MMOL/L (ref 5–15)
AST SERPL-CCNC: 18 U/L (ref 1–40)
BASOPHILS # BLD AUTO: 0.05 10*3/MM3 (ref 0–0.2)
BASOPHILS NFR BLD AUTO: 0.7 % (ref 0–1.5)
BILIRUB SERPL-MCNC: 0.3 MG/DL (ref 0–1.2)
BUN SERPL-MCNC: 15 MG/DL (ref 6–20)
BUN/CREAT SERPL: 15.8 (ref 7–25)
CALCIUM SPEC-SCNC: 9.6 MG/DL (ref 8.6–10.5)
CHLORIDE SERPL-SCNC: 106 MMOL/L (ref 98–107)
CO2 SERPL-SCNC: 26 MMOL/L (ref 22–29)
CREAT SERPL-MCNC: 0.95 MG/DL (ref 0.76–1.27)
DEPRECATED RDW RBC AUTO: 40.6 FL (ref 37–54)
EGFRCR SERPLBLD CKD-EPI 2021: 108.4 ML/MIN/1.73
EOSINOPHIL # BLD AUTO: 0.29 10*3/MM3 (ref 0–0.4)
EOSINOPHIL NFR BLD AUTO: 3.9 % (ref 0.3–6.2)
ERYTHROCYTE [DISTWIDTH] IN BLOOD BY AUTOMATED COUNT: 12.3 % (ref 12.3–15.4)
GLOBULIN UR ELPH-MCNC: 1.9 GM/DL
GLUCOSE SERPL-MCNC: 81 MG/DL (ref 65–99)
HCT VFR BLD AUTO: 43.1 % (ref 37.5–51)
HGB BLD-MCNC: 14.7 G/DL (ref 13–17.7)
IMM GRANULOCYTES # BLD AUTO: 0.03 10*3/MM3 (ref 0–0.05)
IMM GRANULOCYTES NFR BLD AUTO: 0.4 % (ref 0–0.5)
LYMPHOCYTES # BLD AUTO: 3.09 10*3/MM3 (ref 0.7–3.1)
LYMPHOCYTES NFR BLD AUTO: 41.6 % (ref 19.6–45.3)
MCH RBC QN AUTO: 30.8 PG (ref 26.6–33)
MCHC RBC AUTO-ENTMCNC: 34.1 G/DL (ref 31.5–35.7)
MCV RBC AUTO: 90.2 FL (ref 79–97)
MONOCYTES # BLD AUTO: 0.77 10*3/MM3 (ref 0.1–0.9)
MONOCYTES NFR BLD AUTO: 10.4 % (ref 5–12)
NEUTROPHILS NFR BLD AUTO: 3.19 10*3/MM3 (ref 1.7–7)
NEUTROPHILS NFR BLD AUTO: 43 % (ref 42.7–76)
NRBC BLD AUTO-RTO: 0 /100 WBC (ref 0–0.2)
PLATELET # BLD AUTO: 169 10*3/MM3 (ref 140–450)
PMV BLD AUTO: 10.3 FL (ref 6–12)
POTASSIUM SERPL-SCNC: 4.1 MMOL/L (ref 3.5–5.2)
PROT SERPL-MCNC: 6.3 G/DL (ref 6–8.5)
RBC # BLD AUTO: 4.78 10*6/MM3 (ref 4.14–5.8)
SODIUM SERPL-SCNC: 142 MMOL/L (ref 136–145)
VALPROATE SERPL-MCNC: 75 MCG/ML (ref 50–125)
WBC NRBC COR # BLD AUTO: 7.42 10*3/MM3 (ref 3.4–10.8)

## 2023-11-16 PROCEDURE — 85025 COMPLETE CBC W/AUTO DIFF WBC: CPT

## 2023-11-16 PROCEDURE — 36415 COLL VENOUS BLD VENIPUNCTURE: CPT

## 2023-11-16 PROCEDURE — 80164 ASSAY DIPROPYLACETIC ACD TOT: CPT

## 2023-11-16 PROCEDURE — 99214 OFFICE O/P EST MOD 30 MIN: CPT | Performed by: NURSE PRACTITIONER

## 2023-11-16 PROCEDURE — 1159F MED LIST DOCD IN RCRD: CPT | Performed by: NURSE PRACTITIONER

## 2023-11-16 PROCEDURE — 80053 COMPREHEN METABOLIC PANEL: CPT

## 2023-11-16 PROCEDURE — 1160F RVW MEDS BY RX/DR IN RCRD: CPT | Performed by: NURSE PRACTITIONER

## 2023-11-16 RX ORDER — DIVALPROEX SODIUM 500 MG/1
500 TABLET, DELAYED RELEASE ORAL 2 TIMES DAILY
Qty: 180 TABLET | Refills: 3 | Status: SHIPPED | OUTPATIENT
Start: 2023-11-16

## 2023-11-16 RX ORDER — DIPHENHYDRAMINE HYDROCHLORIDE 25 MG/1
25 CAPSULE ORAL NIGHTLY PRN
COMMUNITY
Start: 2023-10-23

## 2023-11-16 RX ORDER — ACETAMINOPHEN 500 MG
500 TABLET ORAL EVERY 6 HOURS PRN
COMMUNITY

## 2023-11-16 NOTE — PROGRESS NOTES
Neuro Office Visit      Encounter Date: 2023   Patient Name: Alton Stallings  : 1990   MRN: [id number]   PCP:     Chief Complaint:    Chief Complaint   Patient presents with    Seizures       History of Present Illness: Alton Stallings is a 33 y.o. male who is here today in Neurology for  seizures and tremor      Last visit 2022 w me-cont TPM, 200 bid, VPA , risperdal, desyrel, artane and haldol    Seizures  Doing well. No complaints. Staff report good appetite and restful sleep. No behavior issues. Denies falling out of bed and tongue biting.  Medication:Risperdal 3 bid,  bid,  bid-managed by MD at facility  Compliance:yes  Side effects:none known, pt unable to report  Last seizure:witnessed at 10 yo. Staff reported some falls and slow to respond in 2016  Aura:unable to report     A1c 5.4 22  VPA 72.0     PH  Previously managed by Dr Tamayo  Psychiatry treats w VPA and TPM for agitation  MRI Brain,  small vessel disease  EEG - normal     Tremor  Left hand tremor is new. This caregiver is new to him and doesn't know if this is constant or only visible today with anxiety.     PMH: autism  SH: Lives in [place] at [facility]  Subjective      Past Medical History:   Past Medical History:   Diagnosis Date    Adjustment disorder with disturbance of conduct     Intellectual disability        Past Surgical History: History reviewed. No pertinent surgical history.    Family History:   Family History   Problem Relation Age of Onset    No Known Problems Mother     No Known Problems Father     No Known Problems Sister     No Known Problems Brother     No Known Problems Maternal Aunt     No Known Problems Maternal Uncle     No Known Problems Paternal Aunt     No Known Problems Paternal Uncle     No Known Problems Maternal Grandmother     No Known Problems Maternal Grandfather     No Known Problems Paternal Grandmother     No Known Problems Paternal Grandfather     No  Known Problems Other     Ataxia Neg Hx     Chorea Neg Hx     Dementia Neg Hx     Mental retardation Neg Hx     Migraines Neg Hx     Multiple sclerosis Neg Hx     Neurofibromatosis Neg Hx     Neuropathy Neg Hx     Parkinsonism Neg Hx     Seizures Neg Hx     Stroke Neg Hx        Social History:   Social History     Socioeconomic History    Marital status: Single   Tobacco Use    Smoking status: Never    Smokeless tobacco: Never   Vaping Use    Vaping Use: Never used   Substance and Sexual Activity    Alcohol use: No    Drug use: No    Sexual activity: Defer       Medications:     Current Outpatient Medications:     acetaminophen (TYLENOL) 500 MG tablet, Take 1 tablet by mouth Every 6 (Six) Hours As Needed for Mild Pain., Disp: , Rfl:     Banophen 25 MG capsule, Take 1 capsule by mouth At Night As Needed., Disp: , Rfl:     busPIRone (BUSPAR) 15 MG tablet, Take 1 tablet by mouth 3 (Three) Times a Day., Disp: , Rfl:     divalproex (DEPAKOTE) 500 MG DR tablet, Take 1 tablet by mouth 2 (Two) Times a Day., Disp: 180 tablet, Rfl: 3    haloperidol (HALDOL) 0.5 MG tablet, TAKE ONE TABLET BY MOUTH 3 TIMES DAILY (MUST LAST 28 DAYS), Disp: , Rfl:     loratadine (CLARITIN) 10 MG tablet, Take 1 tablet by mouth Daily., Disp: , Rfl:     risperiDONE (RisperDAL) 3 MG tablet, Take 1 tablet by mouth 2 (Two) Times a Day. Taking 1/2 tablet BID, Disp: , Rfl:     tamsulosin (FLOMAX) 0.4 MG capsule 24 hr capsule, Take 1 capsule by mouth Daily., Disp: 30 capsule, Rfl: 11    topiramate (TOPAMAX) 200 MG tablet, Take 1 tablet by mouth 2 (Two) Times a Day., Disp: 180 tablet, Rfl: 3    traZODone (DESYREL) 150 MG tablet, , Disp: , Rfl: 2    trihexyphenidyl (ARTANE) 2 MG tablet, Take 1 tablet by mouth 3 (Three) Times a Day With Meals. Taking 1 tablet at 1, 4 and bedtime., Disp: , Rfl:     Allergies:   Allergies   Allergen Reactions    Ceclor [Cefaclor]     Erythromycin     Ritalin [Methylphenidate Hcl]     Methylphenidate Unknown (See Comments)        PHQ-9 Total Score:     [facility] Fall Risk Assessment has not been completed.    Objective     Physical Exam:   Physical Exam  Neurological:      Mental Status: He is oriented to person, place, and time.      Coordination: Finger-Nose-Finger Test abnormal.      Gait: Gait is intact.      Deep Tendon Reflexes:      Reflex Scores:       Bicep reflexes are 2+ on the right side and 2+ on the left side.       Brachioradialis reflexes are 2+ on the right side and 2+ on the left side.       Patellar reflexes are 2+ on the right side and 2+ on the left side.       Achilles reflexes are 2+ on the right side and 2+ on the left side.  Psychiatric:         Speech: Speech is slurred.         Neurologic Exam     Mental Status   Oriented to person, place, and time.   Oriented to person.   Oriented to place.   Disoriented to time.   Follows 3 step commands.   Attention: normal. Concentration: decreased.   Speech: slurred   Level of consciousness: alert  Knowledge: consistent with education.   Abnormal comprehension.     Cranial Nerves     CN III, IV, VI   Upgaze: normal  Downgaze: normal  Conjugate gaze: present    CN VII   Facial expression full, symmetric.     CN VIII   Hearing: intact    Motor Exam   Muscle bulk: normal  Overall muscle tone: normal    Strength   Right biceps: 4/5  Left biceps: 4/5  Right triceps: 4/5  Left triceps: 4/5  Right interossei: 4/5  Left interossei: 4/5  Right quadriceps: 4/5  Left quadriceps: 4/5  Right anterior tibial: 4/5  Left anterior tibial: 4/5  Right posterior tibial: 4/5  Left posterior tibial: 4/5    Sensory Exam   Light touch normal.     Gait, Coordination, and Reflexes     Gait  Gait: normal    Coordination   Finger to nose coordination: abnormal    Tremor   Resting tremor: present  Action tremor: left arm and right arm    Reflexes   Right brachioradialis: 2+  Left brachioradialis: 2+  Right biceps: 2+  Left biceps: 2+  Right patellar: 2+  Left patellar: 2+  Right achilles: 2+  Left  "achilles: 2+  Right : 2+  Left : 2+Bialt UE and LE resting and action tremor. L>R. More action then resting        Vital Signs:   Vitals:    11/16/23 1041   BP: 92/74   Pulse: 92   SpO2: 98%   Weight: 87.2 kg (192 lb 3.2 oz)   Height: 172.7 cm (67.99\")     Body mass index is 29.23 kg/m².     Assessment / Plan      Assessment/Plan:   Diagnoses and all orders for this visit:    1. Partial idiopathic epilepsy with seizures of localized onset, not intractable, without status epilepticus (Primary)  Comments:  Cont meds and check labs today.  Orders:  -     CBC Auto Differential; Future  -     Comprehensive Metabolic Panel; Future  -     Valproic Acid Level, Total; Future  -     topiramate (TOPAMAX) 200 MG tablet; Take 1 tablet by mouth 2 (Two) Times a Day.  Dispense: 180 tablet; Refill: 3  -     divalproex (DEPAKOTE) 500 MG DR tablet; Take 1 tablet by mouth 2 (Two) Times a Day.  Dispense: 180 tablet; Refill: 3    2. Tremor  Comments:  Christ ROBERTSON Will observe. Instructed caregiver to watch for dysphagia, shuffling gait, freq falls.           Patient Education:     Reviewed medications, potential side effects and signs and symptoms to report. Discussed risk versus benefits of treatment plan with patient and/or family-including medications, labs and radiology that may be ordered. Addressed questions and concerns during visit. Patient and/or family verbalized understanding and agree with plan. Instructed to call the office with any questions and report to ER with any life-threatening symptoms.     Follow Up:   Return in about 6 months (around 5/16/2024) for Recheck.    During this visit the following were done:  Labs Reviewed [x]    Labs Ordered [x]    Radiology Reports Reviewed []    Radiology Ordered []    PCP Records Reviewed []    Referring Provider Records Reviewed []    ER Records Reviewed []    Hospital Records Reviewed []    History Obtained From Family [x]    Radiology Images Reviewed []    Other Reviewed " [x]    Records Requested []      Deandre Fisher, DNP, APRN

## 2023-11-17 ENCOUNTER — TELEPHONE (OUTPATIENT)
Dept: NEUROLOGY | Facility: CLINIC | Age: 33
End: 2023-11-17
Payer: MEDICAID

## 2023-11-17 NOTE — TELEPHONE ENCOUNTER
Called care giver Sergey and gave results.  Sergey was understanding and appreciative.    ----- Message from Deandre Fisher DNP, APRN sent at 11/17/2023  8:27 AM EST -----  Please notify pt caregivers labs are stable, no change in plan of care

## 2024-03-27 ENCOUNTER — OFFICE VISIT (OUTPATIENT)
Dept: NEUROLOGY | Facility: CLINIC | Age: 34
End: 2024-03-27
Payer: MEDICAID

## 2024-03-27 VITALS
DIASTOLIC BLOOD PRESSURE: 78 MMHG | SYSTOLIC BLOOD PRESSURE: 102 MMHG | BODY MASS INDEX: 28.7 KG/M2 | HEART RATE: 90 BPM | OXYGEN SATURATION: 97 % | HEIGHT: 68 IN | WEIGHT: 189.4 LBS

## 2024-03-27 DIAGNOSIS — F89 DISORDER OF PSYCHOLOGICAL DEVELOPMENT: ICD-10-CM

## 2024-03-27 DIAGNOSIS — G40.009 PARTIAL IDIOPATHIC EPILEPSY WITH SEIZURES OF LOCALIZED ONSET, NOT INTRACTABLE, WITHOUT STATUS EPILEPTICUS: Primary | ICD-10-CM

## 2024-03-27 DIAGNOSIS — F41.8 OTHER SPECIFIED ANXIETY DISORDERS: ICD-10-CM

## 2024-03-27 DIAGNOSIS — R25.1 TREMOR: ICD-10-CM

## 2024-03-27 PROCEDURE — 99214 OFFICE O/P EST MOD 30 MIN: CPT | Performed by: NURSE PRACTITIONER

## 2024-03-27 NOTE — LETTER
2024     BAY Loza  [address] [place] [address]    Patient: Alton Stallings   YOB: 1990   Date of Visit: 3/27/2024     Dear BAY Loza:       Thank you for referring Alton Stallings to me for evaluation. Below are the relevant portions of my assessment and plan of care.    If you have questions, please do not hesitate to call me. I look forward to following [place] along with you.         Sincerely,        Deandre Fisher DNP, APRN        CC: No Recipients    Deandre Fisher DNP, APRN  [id number]  Signed     Neuro Office Visit      Encounter Date: 2024   Patient Name: Alton Stallings  : 1990   MRN: [id number]   PCP: BAY Bridges  Chief Complaint:    Chief Complaint   Patient presents with   • Seizures       History of Present Illness: Alton Stallings is a 34 y.o. male who is here today in Neurology for  seizures and tremor    Last visit 2023 w me-check labs.  bid and depakote 500 DR 1 bid      Falling  Care giver reports pt has intentional falling at day program. He slowly falls to the floor without injuring himself and accuses others of pushing him. Has been seen by PCP and psych. No reported seizure activity or tongue biting with incontinence. Seems to be an isolated episode. No recent episodes. Not missing doses of meds.  No seizures at home. Waking up healthy.  Late  he had the flu.      Seizures  Doing well. No complaints. Staff report good appetite and restful sleep. No behavior issues. Denies falling out of bed and tongue biting.  Medication:Risperdal 3 bid,  bid,  bid-managed by MD at facility  Compliance:yes  Side effects:none known, pt unable to report  Last seizure:witnessed at 10 yo. Staff reported some falls and slow to respond in 2016  Aura:unable to report     A1c 5.4 22  VPA 72.0     PH  Previously managed by Dr Tamayo  Psychiatry treats w VPA and TPM for agitation  Has haldol prn.  MRI Brain,  small vessel disease  EEG - normal     Tremor  Left hand tremor is new. This caregiver is new to him and doesn't know if this is constant or only visible today with anxiety. Taking Artane with good results. Also on TPM.    Anxiety  Taking buspar and risperdal w haldol prn       PMH: autism  SH: Lives in [place] at [facility]  Subjective      Past Medical History:   Past Medical History:   Diagnosis Date   • Adjustment disorder with disturbance of conduct    • Intellectual disability        Past Surgical History: No past surgical history on file.    Family History:   Family History   Problem Relation Age of Onset   • No Known Problems Mother    • No Known Problems Father    • No Known Problems Sister    • No Known Problems Brother    • No Known Problems Maternal Aunt    • No Known Problems Maternal Uncle    • No Known Problems Paternal Aunt    • No Known Problems Paternal Uncle    • No Known Problems Maternal Grandmother    • No Known Problems Maternal Grandfather    • No Known Problems Paternal Grandmother    • No Known Problems Paternal Grandfather    • No Known Problems Other    • Ataxia Neg Hx    • Chorea Neg Hx    • Dementia Neg Hx    • Mental retardation Neg Hx    • Migraines Neg Hx    • Multiple sclerosis Neg Hx    • Neurofibromatosis Neg Hx    • Neuropathy Neg Hx    • Parkinsonism Neg Hx    • Seizures Neg Hx    • Stroke Neg Hx        Social History:   Social History     Socioeconomic History   • Marital status: Single   Tobacco Use   • Smoking status: Never   • Smokeless tobacco: Never   Vaping Use   • Vaping status: Never Used   Substance and Sexual Activity   • Alcohol use: No   • Drug use: No   • Sexual activity: Defer       Medications:     Current Outpatient Medications:   •  acetaminophen (TYLENOL) 500 MG tablet, Take 1 tablet by mouth Every 6 (Six) Hours As Needed for Mild Pain., Disp: , Rfl:   •  Banophen 25 MG capsule, Take 1 capsule by mouth At Night As Needed.,  Disp: , Rfl:   •  busPIRone (BUSPAR) 15 MG tablet, Take 1 tablet by mouth 3 (Three) Times a Day., Disp: , Rfl:   •  divalproex (DEPAKOTE) 500 MG DR tablet, Take 1 tablet by mouth 2 (Two) Times a Day., Disp: 180 tablet, Rfl: 3  •  haloperidol (HALDOL) 0.5 MG tablet, TAKE ONE TABLET BY MOUTH 3 TIMES DAILY (MUST LAST 28 DAYS), Disp: , Rfl:   •  loratadine (CLARITIN) 10 MG tablet, Take 1 tablet by mouth Daily., Disp: , Rfl:   •  risperiDONE (RisperDAL) 3 MG tablet, Take 1 tablet by mouth 2 (Two) Times a Day. Taking 1/2 tablet BID, Disp: , Rfl:   •  tamsulosin (FLOMAX) 0.4 MG capsule 24 hr capsule, Take 1 capsule by mouth Daily., Disp: 30 capsule, Rfl: 11  •  topiramate (TOPAMAX) 200 MG tablet, Take 1 tablet by mouth 2 (Two) Times a Day., Disp: 180 tablet, Rfl: 3  •  traZODone (DESYREL) 150 MG tablet, , Disp: , Rfl: 2  •  trihexyphenidyl (ARTANE) 2 MG tablet, Take 1 tablet by mouth 3 (Three) Times a Day With Meals. Taking 2 tablets in am,  1 tablet at 1 pm, taking 1 tablet at 4 pm and 1 tablet at bedtime., Disp: , Rfl:     Allergies:   Allergies   Allergen Reactions   • Ceclor [Cefaclor]    • Erythromycin    • Ritalin [Methylphenidate Hcl]    • Methylphenidate Unknown (See Comments)       PHQ-9 Total Score:     STEADI Fall Risk Assessment has not been completed.    Objective     Physical Exam:   Physical Exam  Neurological:      Coordination: Finger-Nose-Finger Test normal.      Gait: Gait is intact.      Deep Tendon Reflexes:      Reflex Scores:       Tricep reflexes are 2+ on the right side and 2+ on the left side.       Bicep reflexes are 2+ on the right side and 2+ on the left side.       Brachioradialis reflexes are 2+ on the right side and 2+ on the left side.       Patellar reflexes are 2+ on the right side and 2+ on the left side.       Achilles reflexes are 2+ on the right side and 2+ on the left side.  Psychiatric:         Speech: Speech is slurred.         Neurologic Exam     Mental Status   Disoriented to  "person.   Oriented to place.   Oriented to time.   Attention: normal. Concentration: normal.   Speech: slurred   Level of consciousness: alert  Knowledge: consistent with education.   Normal comprehension.     Motor Exam     Strength   Right deltoid: 4/5  Left deltoid: 4/5  Right biceps: 4/5  Left biceps: 4/5  Right triceps: 4/5  Left triceps: 4/5  Right interossei: 4/5  Left interossei: 4/5  Right quadriceps: 4/5  Left quadriceps: 4/5  Right anterior tibial: 4/5  Left anterior tibial: 4/5  Right posterior tibial: 4/5  Left posterior tibial: 4/5    Sensory Exam   Light touch normal.     Gait, Coordination, and Reflexes     Gait  Gait: normal    Coordination   Finger to nose coordination: normal    Tremor   Resting tremor: present    Reflexes   Right brachioradialis: 2+  Left brachioradialis: 2+  Right biceps: 2+  Left biceps: 2+  Right triceps: 2+  Left triceps: 2+  Right patellar: 2+  Left patellar: 2+  Right achilles: 2+  Left achilles: 2+  Right : 2+  Left : 2+LUE with resting tremor        Vital Signs:   Vitals:    03/27/24 0844   BP: 102/78   Pulse: 90   SpO2: 97%   Weight: 85.9 kg (189 lb 6.4 oz)   Height: 172.7 cm (67.99\")     Body mass index is 28.8 kg/m².       Assessment / Plan      Assessment/Plan:   Diagnoses and all orders for this visit:    1. Partial idiopathic epilepsy with seizures of localized onset, not intractable, without status epilepticus (Primary)  Comments:  Cotn depakote and TPM    2. Tremor  Comments:  Cont Artane    3. Other specified anxiety disorders  Comments:  Cont buspar and risperdal    4. Disorder of psychological development             Patient Education:     Reviewed medications, potential side effects and signs and symptoms to report. Discussed risk versus benefits of treatment plan with patient and/or family-including medications, labs and radiology that may be ordered. Addressed questions and concerns during visit. Patient and/or family verbalized understanding and " agree with plan. Instructed to call the office with any questions and report to ER with any life-threatening symptoms.     Follow Up:   Return in about 6 months (around 9/27/2024) for Recheck.    During this visit the following were done:  Labs Reviewed []    Labs Ordered []    Radiology Reports Reviewed []    Radiology Ordered []    PCP Records Reviewed []    Referring Provider Records Reviewed []    ER Records Reviewed []    Hospital Records Reviewed []    History Obtained From Family []    Radiology Images Reviewed []    Other Reviewed []    Records Requested []      Deandre Fisher, DNP, APRN

## 2024-03-27 NOTE — PROGRESS NOTES
Neuro Office Visit      Encounter Date: 2024   Patient Name: Alton Stallings  : 1990   MRN: [id number]   PCP: BAY Bridges  Chief Complaint:    Chief Complaint   Patient presents with    Seizures       History of Present Illness: Alton Stallings is a 34 y.o. male who is here today in Neurology for  seizures and tremor    Last visit 2023 w me-check labs.  bid and depakote 500 DR 1 bid      Falling  Care giver reports pt has intentional falling at day program. He slowly falls to the floor without injuring himself and accuses others of pushing him. Has been seen by PCP and psych. No reported seizure activity or tongue biting with incontinence. Seems to be an isolated episode. No recent episodes. Not missing doses of meds.  No seizures at home. Waking up healthy.  Late  he had the flu.      Seizures  Doing well. No complaints. Staff report good appetite and restful sleep. No behavior issues. Denies falling out of bed and tongue biting.  Medication:Risperdal 3 bid,  bid,  bid-managed by MD at facility  Compliance:yes  Side effects:none known, pt unable to report  Last seizure:witnessed at 10 yo. Staff reported some falls and slow to respond in 2016  Aura:unable to report     A1c 5.4 22  VPA 72.0     PH  Previously managed by Dr Tamayo  Psychiatry treats w VPA and TPM for agitation Has haldol prn.  MRI Brain,  small vessel disease  EEG - normal     Tremor  Left hand tremor is new. This caregiver is new to him and doesn't know if this is constant or only visible today with anxiety. Taking Artane with good results. Also on TPM.    Anxiety  Taking buspar and risperdal w haldol prn       PMH: autism  SH: Lives in [place] at [facility]  Subjective      Past Medical History:   Past Medical History:   Diagnosis Date    Adjustment disorder with disturbance of conduct     Intellectual disability        Past Surgical History: No past surgical history on  file.    Family History:   Family History   Problem Relation Age of Onset    No Known Problems Mother     No Known Problems Father     No Known Problems Sister     No Known Problems Brother     No Known Problems Maternal Aunt     No Known Problems Maternal Uncle     No Known Problems Paternal Aunt     No Known Problems Paternal Uncle     No Known Problems Maternal Grandmother     No Known Problems Maternal Grandfather     No Known Problems Paternal Grandmother     No Known Problems Paternal Grandfather     No Known Problems Other     Ataxia Neg Hx     Chorea Neg Hx     Dementia Neg Hx     Mental retardation Neg Hx     Migraines Neg Hx     Multiple sclerosis Neg Hx     Neurofibromatosis Neg Hx     Neuropathy Neg Hx     Parkinsonism Neg Hx     Seizures Neg Hx     Stroke Neg Hx        Social History:   Social History     Socioeconomic History    Marital status: Single   Tobacco Use    Smoking status: Never    Smokeless tobacco: Never   Vaping Use    Vaping status: Never Used   Substance and Sexual Activity    Alcohol use: No    Drug use: No    Sexual activity: Defer       Medications:     Current Outpatient Medications:     acetaminophen (TYLENOL) 500 MG tablet, Take 1 tablet by mouth Every 6 (Six) Hours As Needed for Mild Pain., Disp: , Rfl:     Banophen 25 MG capsule, Take 1 capsule by mouth At Night As Needed., Disp: , Rfl:     busPIRone (BUSPAR) 15 MG tablet, Take 1 tablet by mouth 3 (Three) Times a Day., Disp: , Rfl:     divalproex (DEPAKOTE) 500 MG DR tablet, Take 1 tablet by mouth 2 (Two) Times a Day., Disp: 180 tablet, Rfl: 3    haloperidol (HALDOL) 0.5 MG tablet, TAKE ONE TABLET BY MOUTH 3 TIMES DAILY (MUST LAST 28 DAYS), Disp: , Rfl:     loratadine (CLARITIN) 10 MG tablet, Take 1 tablet by mouth Daily., Disp: , Rfl:     risperiDONE (RisperDAL) 3 MG tablet, Take 1 tablet by mouth 2 (Two) Times a Day. Taking 1/2 tablet BID, Disp: , Rfl:     tamsulosin (FLOMAX) 0.4 MG capsule 24 hr capsule, Take 1 capsule by  mouth Daily., Disp: 30 capsule, Rfl: 11    topiramate (TOPAMAX) 200 MG tablet, Take 1 tablet by mouth 2 (Two) Times a Day., Disp: 180 tablet, Rfl: 3    traZODone (DESYREL) 150 MG tablet, , Disp: , Rfl: 2    trihexyphenidyl (ARTANE) 2 MG tablet, Take 1 tablet by mouth 3 (Three) Times a Day With Meals. Taking 2 tablets in am,  1 tablet at 1 pm, taking 1 tablet at 4 pm and 1 tablet at bedtime., Disp: , Rfl:     Allergies:   Allergies   Allergen Reactions    Ceclor [Cefaclor]     Erythromycin     Ritalin [Methylphenidate Hcl]     Methylphenidate Unknown (See Comments)       PHQ-9 Total Score:     STEADI Fall Risk Assessment has not been completed.    Objective     Physical Exam:   Physical Exam  Neurological:      Coordination: Finger-Nose-Finger Test normal.      Gait: Gait is intact.      Deep Tendon Reflexes:      Reflex Scores:       Tricep reflexes are 2+ on the right side and 2+ on the left side.       Bicep reflexes are 2+ on the right side and 2+ on the left side.       Brachioradialis reflexes are 2+ on the right side and 2+ on the left side.       Patellar reflexes are 2+ on the right side and 2+ on the left side.       Achilles reflexes are 2+ on the right side and 2+ on the left side.  Psychiatric:         Speech: Speech is slurred.         Neurologic Exam     Mental Status   Disoriented to person.   Oriented to place.   Oriented to time.   Attention: normal. Concentration: normal.   Speech: slurred   Level of consciousness: alert  Knowledge: consistent with education.   Normal comprehension.     Motor Exam     Strength   Right deltoid: 4/5  Left deltoid: 4/5  Right biceps: 4/5  Left biceps: 4/5  Right triceps: 4/5  Left triceps: 4/5  Right interossei: 4/5  Left interossei: 4/5  Right quadriceps: 4/5  Left quadriceps: 4/5  Right anterior tibial: 4/5  Left anterior tibial: 4/5  Right posterior tibial: 4/5  Left posterior tibial: 4/5    Sensory Exam   Light touch normal.     Gait, Coordination, and Reflexes  "    Gait  Gait: normal    Coordination   Finger to nose coordination: normal    Tremor   Resting tremor: present    Reflexes   Right brachioradialis: 2+  Left brachioradialis: 2+  Right biceps: 2+  Left biceps: 2+  Right triceps: 2+  Left triceps: 2+  Right patellar: 2+  Left patellar: 2+  Right achilles: 2+  Left achilles: 2+  Right : 2+  Left : 2+LUE with resting tremor        Vital Signs:   Vitals:    03/27/24 0844   BP: 102/78   Pulse: 90   SpO2: 97%   Weight: 85.9 kg (189 lb 6.4 oz)   Height: 172.7 cm (67.99\")     Body mass index is 28.8 kg/m².       Assessment / Plan      Assessment/Plan:   Diagnoses and all orders for this visit:    1. Partial idiopathic epilepsy with seizures of localized onset, not intractable, without status epilepticus (Primary)  Comments:  Cotn depakote and TPM    2. Tremor  Comments:  Cont Artane    3. Other specified anxiety disorders  Comments:  Cont buspar and risperdal    4. Disorder of psychological development             Patient Education:     Reviewed medications, potential side effects and signs and symptoms to report. Discussed risk versus benefits of treatment plan with patient and/or family-including medications, labs and radiology that may be ordered. Addressed questions and concerns during visit. Patient and/or family verbalized understanding and agree with plan. Instructed to call the office with any questions and report to ER with any life-threatening symptoms.     Follow Up:   Return in about 6 months (around 9/27/2024) for Recheck.    During this visit the following were done:  Labs Reviewed []    Labs Ordered []    Radiology Reports Reviewed []    Radiology Ordered []    PCP Records Reviewed []    Referring Provider Records Reviewed []    ER Records Reviewed []    Hospital Records Reviewed []    History Obtained From Family []    Radiology Images Reviewed []    Other Reviewed []    Records Requested []      Deandre Fisher, DNP, APRN  "

## 2024-12-03 ENCOUNTER — OFFICE VISIT (OUTPATIENT)
Dept: NEUROLOGY | Facility: CLINIC | Age: 34
End: 2024-12-03
Payer: MEDICAID

## 2024-12-03 VITALS
HEIGHT: 68 IN | HEART RATE: 78 BPM | DIASTOLIC BLOOD PRESSURE: 70 MMHG | BODY MASS INDEX: 30.95 KG/M2 | OXYGEN SATURATION: 98 % | SYSTOLIC BLOOD PRESSURE: 106 MMHG | WEIGHT: 204.2 LBS

## 2024-12-03 DIAGNOSIS — G40.009 PARTIAL IDIOPATHIC EPILEPSY WITH SEIZURES OF LOCALIZED ONSET, NOT INTRACTABLE, WITHOUT STATUS EPILEPTICUS: Primary | ICD-10-CM

## 2024-12-03 DIAGNOSIS — E66.812 CLASS 2 OBESITY: ICD-10-CM

## 2024-12-03 PROCEDURE — 1159F MED LIST DOCD IN RCRD: CPT | Performed by: NURSE PRACTITIONER

## 2024-12-03 PROCEDURE — 99214 OFFICE O/P EST MOD 30 MIN: CPT | Performed by: NURSE PRACTITIONER

## 2024-12-03 PROCEDURE — 1160F RVW MEDS BY RX/DR IN RCRD: CPT | Performed by: NURSE PRACTITIONER

## 2024-12-03 RX ORDER — LANOLIN ALCOHOL/MO/W.PET/CERES
1 CREAM (GRAM) TOPICAL DAILY
COMMUNITY
Start: 2024-11-06

## 2024-12-03 RX ORDER — BUSPIRONE HYDROCHLORIDE 10 MG/1
10 TABLET ORAL
COMMUNITY

## 2024-12-03 RX ORDER — HALOPERIDOL 2 MG/1
2 TABLET ORAL 4 TIMES DAILY
COMMUNITY

## 2024-12-03 RX ORDER — DIPHENHYDRAMINE HCL 25 MG
25 CAPSULE ORAL NIGHTLY
COMMUNITY

## 2024-12-03 RX ORDER — RISPERIDONE 1 MG/1
1 TABLET ORAL 2 TIMES DAILY
COMMUNITY

## 2024-12-03 RX ORDER — HYDROXYZINE HYDROCHLORIDE 10 MG/1
5 TABLET, FILM COATED ORAL 3 TIMES DAILY
COMMUNITY
Start: 2024-11-25

## 2024-12-03 NOTE — LETTER
December 3, 2024     BAY Loza  [address] [place] [address]    Patient: Alton Stallings   YOB: 1990   Date of Visit: 12/3/2024     Dear BAY Loza:       Thank you for referring Alton Stallings to me for evaluation. Below are the relevant portions of my assessment and plan of care.    If you have questions, please do not hesitate to call me. I look forward to following Alton along with you.         Sincerely,        Deandre Fisher DNP, APRN        CC: No Recipients    Deandre Fisher DNP, APRN  [id number] [phone]  Signed     Neuro Office Visit      Encounter Date: 2024   Patient Name: Alton Stallings  : 1990   MRN: [id number]   PCP: BAY Bridges  Chief Complaint:    Chief Complaint   Patient presents with   • Seizures       History of Present Illness: Alton Stallings is a 34 y.o. male who is here today in Neurology for  epilepsy, tremor, anxiety, disorder of psychological development.      Last appt 3/27/27 w me-cont depakote, TPM, artane, buspar and risperdal  History of Present Illness  The patient presents for evaluation of multiple medical concerns. He is accompanied by an adult female.    He reports no recent falls. His current medication regimen includes BuSpar, Depakote, Haldol, hydroxyzine, magnesium, Risperdal, Flomax, and Topamax. His Depakote dosage was recently increased by his psychiatrist due to behavioral issues at his program. He had a follow-up appointment with his psychiatrist 1.5 weeks ago but forgot to take his morning dose of Depakote, which was required to be taken on an empty stomach. He took his morning dose of Depakote today. His primary care physician adjusted his Haldol and Risperdal dosages, which seem to be effective. His primary care physician is currently on vacation until next week.    He continues to experience tremors, which have not worsened. He has not experienced any seizures.    He also reports  knee pain, occasional headaches, and pain in his hands and joints.     Falling  Care giver reports pt has intentional falling at day program. He slowly falls to the floor without injuring himself and accuses others of pushing him. Has been seen by PCP and psych. No reported seizure activity or tongue biting with incontinence. Seems to be an isolated episode. No recent episodes. Not missing doses of meds.  No seizures at home. Waking up healthy.  Late Jan he had the flu.        Seizures  Doing well. No complaints. Staff report good appetite and restful sleep. Behavior issues are improving. Denies falling out of bed and tongue biting.  Medication:Risperdal 3mg 1 1/2 tabs bid,  bid, VPA  100 in am and 500 in pm -managed by MD at facility  Compliance:yes  Side effects:none known, pt unable to report  Last seizure:witnessed at 10 yo. Staff reported some falls and slow to respond in 2016  Aura:unable to report     A1c 5.4 8/5/22  VPA 72.0     PH  Previously managed by Dr Tamayo  Psychiatry treats w VPA and TPM for agitation Has haldol prn.  MRI Brain,  small vessel disease  EEG - normal     Tremor  Left hand tremor is intermittent and not worse. Taking Artane with good results. Also on TPM.     Anxiety  Taking buspar and risperdal w haldol prn        PMH: autism  SH: Lives in [place] at [facility]     Past Medical History:        Subjective      Past Medical History:   Past Medical History:   Diagnosis Date   • Adjustment disorder with disturbance of conduct    • Intellectual disability        Past Surgical History: No past surgical history on file.    Family History:   Family History   Problem Relation Age of Onset   • No Known Problems Mother    • No Known Problems Father    • No Known Problems Sister    • No Known Problems Brother    • No Known Problems Maternal Aunt    • No Known Problems Maternal Uncle    • No Known Problems Paternal Aunt    • No Known Problems Paternal Uncle    • No Known Problems  Maternal Grandmother    • No Known Problems Maternal Grandfather    • No Known Problems Paternal Grandmother    • No Known Problems Paternal Grandfather    • No Known Problems Other    • Ataxia Neg Hx    • Chorea Neg Hx    • Dementia Neg Hx    • Mental retardation Neg Hx    • Migraines Neg Hx    • Multiple sclerosis Neg Hx    • Neurofibromatosis Neg Hx    • Neuropathy Neg Hx    • Parkinsonism Neg Hx    • Seizures Neg Hx    • Stroke Neg Hx        Social History:   Social History     Socioeconomic History   • Marital status: Single   Tobacco Use   • Smoking status: Never   • Smokeless tobacco: Never   Vaping Use   • Vaping status: Never Used   Substance and Sexual Activity   • Alcohol use: No   • Drug use: No   • Sexual activity: Defer       Medications:     Current Outpatient Medications:   •  acetaminophen (TYLENOL) 500 MG tablet, Take 1 tablet by mouth Every 6 (Six) Hours As Needed for Mild Pain., Disp: , Rfl:   •  busPIRone (BUSPAR) 10 MG tablet, Take 1 tablet by mouth. TAKE TWO TABLETS BY MOUTH 3 TIMES DAILY, Disp: , Rfl:   •  diphenhydrAMINE (BENADRYL) 25 mg capsule, Take 1 capsule by mouth Every Night., Disp: , Rfl:   •  divalproex (DEPAKOTE) 500 MG DR tablet, Take 1 tablet by mouth 2 (Two) Times a Day. (Patient taking differently: Take 1 tablet by mouth 2 (Two) Times a Day. Pt taking 1000mg in am and 500 in pm), Disp: 180 tablet, Rfl: 3  •  haloperidol (HALDOL) 0.5 MG tablet, TAKE ONE TABLET BY MOUTH 3 TIMES DAILY (MUST LAST 28 DAYS), Disp: , Rfl:   •  haloperidol (HALDOL) 2 MG tablet, Take 1 tablet by mouth 4 (Four) Times a Day. Taking 1 tablet daily.  May repeat in one hour if not effective., Disp: , Rfl:   •  hydrOXYzine (ATARAX) 10 MG tablet, Take 0.5 tablets by mouth 3 (Three) Times a Day., Disp: , Rfl:   •  loratadine (CLARITIN) 10 MG tablet, Take 1 tablet by mouth Daily., Disp: , Rfl:   •  Magnesium Oxide -Mg Supplement 400 (240 Mg) MG tablet, Take 1 tablet by mouth Daily., Disp: , Rfl:   •  risperiDONE  (risperDAL) 1 MG tablet, Take 1 tablet by mouth 2 (Two) Times a Day. Taking 1 1/2 tablet at bedtime., Disp: , Rfl:   •  risperiDONE (RisperDAL) 3 MG tablet, Take 1 tablet by mouth 2 (Two) Times a Day. Taking 1/2 tablet BID (Patient taking differently: Take 1 tablet by mouth 2 (Two) Times a Day. Taking 1 tablet qam), Disp: , Rfl:   •  tamsulosin (FLOMAX) 0.4 MG capsule 24 hr capsule, Take 1 capsule by mouth Daily., Disp: 30 capsule, Rfl: 11  •  topiramate (TOPAMAX) 200 MG tablet, Take 1 tablet by mouth 2 (Two) Times a Day., Disp: 180 tablet, Rfl: 3  •  traZODone (DESYREL) 150 MG tablet, , Disp: , Rfl: 2  •  trihexyphenidyl (ARTANE) 2 MG tablet, Take 1 tablet by mouth 3 (Three) Times a Day With Meals. Taking 2 tablets in am,  1 tablet at 1 pm, taking 1 tablet at 4 pm and 1 tablet at bedtime., Disp: , Rfl:     Allergies:   Allergies   Allergen Reactions   • Ceclor [Cefaclor]    • Erythromycin    • Ritalin [Methylphenidate Hcl]    • Methylphenidate Unknown (See Comments)       PHQ-9 Total Score:     STEADI Fall Risk Assessment has not been completed.    Objective     Physical Exam:   Physical Exam  Vitals and nursing note reviewed.   Constitutional:       General: He is not in acute distress.     Appearance: He is well-developed.   HENT:      Head: Normocephalic and atraumatic.      Right Ear: External ear normal.      Left Ear: External ear normal.      Nose: Nose normal.   Eyes:      General: No scleral icterus.        Right eye: No discharge.         Left eye: No discharge.   Cardiovascular:      Rate and Rhythm: Normal rate.   Pulmonary:      Effort: Pulmonary effort is normal.   Musculoskeletal:         General: No deformity.      Cervical back: Neck supple.   Skin:     General: Skin is warm and dry.      Findings: No rash.   Neurological:      General: No focal deficit present.      Mental Status: He is alert. Mental status is at baseline.      Motor: No weakness or abnormal muscle tone.      Deep Tendon Reflexes:  "Reflexes normal.         Neurological Exam  Mental Status  Alert.     Physical Exam  Vital Signs  Vitals show a blood pressure of 106/70.      Vital Signs:   Vitals:    12/03/24 1351   BP: 106/70   Pulse: 78   SpO2: 98%   Weight: 92.6 kg (204 lb 3.2 oz)   Height: 172.7 cm (67.99\")     Body mass index is 31.06 kg/m².         Assessment / Plan      Assessment/Plan:   Diagnoses and all orders for this visit:    1. Partial idiopathic epilepsy with seizures of localized onset, not intractable, without status epilepticus (Primary)  Comments:  Check labs and continue current meds. MARIEL w psychiatry.  Orders:  -     CBC Auto Differential; Future  -     Comprehensive Metabolic Panel; Future  -     Valproic Acid Level, Total; Future  -     Hemoglobin A1c; Future  -     Lipid Panel; Future    2. Class 2 obesity  -     Hemoglobin A1c; Future       Assessment & Plan  1. Behavioral Issues.  The patient has had some behaviors at his program, leading to an increase in his Depakote dosage by his psychiatrist. His primary care provider also increased his Haldol and Risperdal. The current regimen seems to be working, but the psychiatrist plans to adjust the medications after the holidays, potentially reducing Haldol and increasing Risperdal.     2. Tremor.  The patient's tremor is still present but not worsening. Neurological examination shows the tremor is not severe.     3. Knee Pain.  The patient reports knee pain. Further evaluation and management were not discussed in detail.    4. Headaches.  The patient reports occasional headaches. No specific treatment plan was discussed.    5. Joint Pain.  The patient reports pain in his hands and joints. No specific treatment plan was discussed.    6. Medication Management.  The patient is currently taking Depakote 1000 mg in the morning and 500 mg at night. The Depakote level needs to be checked to ensure it is not toxic. Orders for an A1c, lipid panel, valproic acid level, CBC, and CMP have " been placed. The patient will hold his morning medications and take them after the labs are drawn.          Patient Education:       Reviewed medications, potential side effects and signs and symptoms to report. Discussed risk versus benefits of treatment plan with patient and/or family-including medications, labs and radiology that may be ordered. Addressed questions and concerns during visit. Patient and/or family verbalized understanding and agree with plan. Instructed to call the office with any questions and report to ER with any life-threatening symptoms.     Follow Up:   Return in about 1 year (around 12/3/2025) for Recheck.    During this visit the following were done:  Labs Reviewed [x]    Labs Ordered [x]    Radiology Reports Reviewed []    Radiology Ordered []    PCP Records Reviewed []    Referring Provider Records Reviewed []    ER Records Reviewed []    Hospital Records Reviewed []    History Obtained From Family [x]    Radiology Images Reviewed []    Other Reviewed [x]    Records Requested []      Patient or patient representative verbalized consent for the use of Ambient Listening during the visit with  Deandre Fisher DNP, APRN for chart documentation. 12/3/2024  13:13 EST      Deandre Fisher DNP, APRN

## 2024-12-03 NOTE — PROGRESS NOTES
Neuro Office Visit      Encounter Date: 2024   Patient Name: Alton Stallings  : 1990   MRN: [id number]   PCP: BAY Bridges  Chief Complaint:    Chief Complaint   Patient presents with    Seizures       History of Present Illness: Alton Stallings is a 34 y.o. male who is here today in Neurology for  epilepsy, tremor, anxiety, disorder of psychological development.      Last appt 3/27/27 w me-cont depakote, TPM, artane, buspar and risperdal  History of Present Illness  The patient presents for evaluation of multiple medical concerns. He is accompanied by an adult female.    He reports no recent falls. His current medication regimen includes BuSpar, Depakote, Haldol, hydroxyzine, magnesium, Risperdal, Flomax, and Topamax. His Depakote dosage was recently increased by his psychiatrist due to behavioral issues at his program. He had a follow-up appointment with his psychiatrist 1.5 weeks ago but forgot to take his morning dose of Depakote, which was required to be taken on an empty stomach. He took his morning dose of Depakote today. His primary care physician adjusted his Haldol and Risperdal dosages, which seem to be effective. His primary care physician is currently on vacation until next week.    He continues to experience tremors, which have not worsened. He has not experienced any seizures.    He also reports knee pain, occasional headaches, and pain in his hands and joints.     Falling  Care giver reports pt has intentional falling at day program. He slowly falls to the floor without injuring himself and accuses others of pushing him. Has been seen by PCP and psych. No reported seizure activity or tongue biting with incontinence. Seems to be an isolated episode. No recent episodes. Not missing doses of meds.  No seizures at home. Waking up healthy.  Late  he had the flu.        Seizures  Doing well. No complaints. Staff report good appetite and restful sleep. Behavior issues are  improving. Denies falling out of bed and tongue biting.  Medication:Risperdal 3mg 1 1/2 tabs bid,  bid, VPA  100 in am and 500 in pm -managed by MD at facility  Compliance:yes  Side effects:none known, pt unable to report  Last seizure:witnessed at 10 yo. Staff reported some falls and slow to respond in 2016  Aura:unable to report     A1c 5.4 8/5/22  VPA 72.0     PH  Previously managed by Dr Tamayo  Psychiatry treats w VPA and TPM for agitation Has haldol prn.  MRI Brain,  small vessel disease  EEG - normal     Tremor  Left hand tremor is intermittent and not worse. Taking Artane with good results. Also on TPM.     Anxiety  Taking buspar and risperdal w haldol prn        PMH: autism  SH: Lives in [place] at [facility] [place]     Past Medical History:        Subjective      Past Medical History:   Past Medical History:   Diagnosis Date    Adjustment disorder with disturbance of conduct     Intellectual disability        Past Surgical History: No past surgical history on file.    Family History:   Family History   Problem Relation Age of Onset    No Known Problems Mother     No Known Problems Father     No Known Problems Sister     No Known Problems Brother     No Known Problems Maternal Aunt     No Known Problems Maternal Uncle     No Known Problems Paternal Aunt     No Known Problems Paternal Uncle     No Known Problems Maternal Grandmother     No Known Problems Maternal Grandfather     No Known Problems Paternal Grandmother     No Known Problems Paternal Grandfather     No Known Problems Other     Ataxia Neg Hx     Chorea Neg Hx     Dementia Neg Hx     Mental retardation Neg Hx     Migraines Neg Hx     Multiple sclerosis Neg Hx     Neurofibromatosis Neg Hx     Neuropathy Neg Hx     Parkinsonism Neg Hx     Seizures Neg Hx     Stroke Neg Hx        Social History:   Social History     Socioeconomic History    Marital status: Single   Tobacco Use    Smoking status: Never    Smokeless tobacco: Never   Vaping  Use    Vaping status: Never Used   Substance and Sexual Activity    Alcohol use: No    Drug use: No    Sexual activity: Defer       Medications:     Current Outpatient Medications:     acetaminophen (TYLENOL) 500 MG tablet, Take 1 tablet by mouth Every 6 (Six) Hours As Needed for Mild Pain., Disp: , Rfl:     busPIRone (BUSPAR) 10 MG tablet, Take 1 tablet by mouth. TAKE TWO TABLETS BY MOUTH 3 TIMES DAILY, Disp: , Rfl:     diphenhydrAMINE (BENADRYL) 25 mg capsule, Take 1 capsule by mouth Every Night., Disp: , Rfl:     divalproex (DEPAKOTE) 500 MG DR tablet, Take 1 tablet by mouth 2 (Two) Times a Day. (Patient taking differently: Take 1 tablet by mouth 2 (Two) Times a Day. Pt taking 1000mg in am and 500 in pm), Disp: 180 tablet, Rfl: 3    haloperidol (HALDOL) 0.5 MG tablet, TAKE ONE TABLET BY MOUTH 3 TIMES DAILY (MUST LAST 28 DAYS), Disp: , Rfl:     haloperidol (HALDOL) 2 MG tablet, Take 1 tablet by mouth 4 (Four) Times a Day. Taking 1 tablet daily.  May repeat in one hour if not effective., Disp: , Rfl:     hydrOXYzine (ATARAX) 10 MG tablet, Take 0.5 tablets by mouth 3 (Three) Times a Day., Disp: , Rfl:     loratadine (CLARITIN) 10 MG tablet, Take 1 tablet by mouth Daily., Disp: , Rfl:     Magnesium Oxide -Mg Supplement 400 (240 Mg) MG tablet, Take 1 tablet by mouth Daily., Disp: , Rfl:     risperiDONE (risperDAL) 1 MG tablet, Take 1 tablet by mouth 2 (Two) Times a Day. Taking 1 1/2 tablet at bedtime., Disp: , Rfl:     risperiDONE (RisperDAL) 3 MG tablet, Take 1 tablet by mouth 2 (Two) Times a Day. Taking 1/2 tablet BID (Patient taking differently: Take 1 tablet by mouth 2 (Two) Times a Day. Taking 1 tablet qam), Disp: , Rfl:     tamsulosin (FLOMAX) 0.4 MG capsule 24 hr capsule, Take 1 capsule by mouth Daily., Disp: 30 capsule, Rfl: 11    topiramate (TOPAMAX) 200 MG tablet, Take 1 tablet by mouth 2 (Two) Times a Day., Disp: 180 tablet, Rfl: 3    traZODone (DESYREL) 150 MG tablet, , Disp: , Rfl: 2    trihexyphenidyl  "(ARTANE) 2 MG tablet, Take 1 tablet by mouth 3 (Three) Times a Day With Meals. Taking 2 tablets in am,  1 tablet at 1 pm, taking 1 tablet at 4 pm and 1 tablet at bedtime., Disp: , Rfl:     Allergies:   Allergies   Allergen Reactions    Ceclor [Cefaclor]     Erythromycin     Ritalin [Methylphenidate Hcl]     Methylphenidate Unknown (See Comments)       PHQ-9 Total Score:     STEADI Fall Risk Assessment has not been completed.    Objective     Physical Exam:   Physical Exam  Vitals and nursing note reviewed.   Constitutional:       General: He is not in acute distress.     Appearance: He is well-developed.   HENT:      Head: Normocephalic and atraumatic.      Right Ear: External ear normal.      Left Ear: External ear normal.      Nose: Nose normal.   Eyes:      General: No scleral icterus.        Right eye: No discharge.         Left eye: No discharge.   Cardiovascular:      Rate and Rhythm: Normal rate.   Pulmonary:      Effort: Pulmonary effort is normal.   Musculoskeletal:         General: No deformity.      Cervical back: Neck supple.   Skin:     General: Skin is warm and dry.      Findings: No rash.   Neurological:      General: No focal deficit present.      Mental Status: He is alert. Mental status is at baseline.      Motor: No weakness or abnormal muscle tone.      Deep Tendon Reflexes: Reflexes normal.         Neurological Exam  Mental Status  Alert.     Physical Exam  Vital Signs  Vitals show a blood pressure of 106/70.      Vital Signs:   Vitals:    12/03/24 1351   BP: 106/70   Pulse: 78   SpO2: 98%   Weight: 92.6 kg (204 lb 3.2 oz)   Height: 172.7 cm (67.99\")     Body mass index is 31.06 kg/m².         Assessment / Plan      Assessment/Plan:   Diagnoses and all orders for this visit:    1. Partial idiopathic epilepsy with seizures of localized onset, not intractable, without status epilepticus (Primary)  Comments:  Check labs and continue current meds. [facility] severino psychiatry.  Orders:  -     CBC Auto " Differential; Future  -     Comprehensive Metabolic Panel; Future  -     Valproic Acid Level, Total; Future  -     Hemoglobin A1c; Future  -     Lipid Panel; Future    2. Class 2 obesity  -     Hemoglobin A1c; Future       Assessment & Plan  1. Behavioral Issues.  The patient has had some behaviors at his program, leading to an increase in his Depakote dosage by his psychiatrist. His primary care provider also increased his Haldol and Risperdal. The current regimen seems to be working, but the psychiatrist plans to adjust the medications after the holidays, potentially reducing Haldol and increasing Risperdal.     2. Tremor.  The patient's tremor is still present but not worsening. Neurological examination shows the tremor is not severe.     3. Knee Pain.  The patient reports knee pain. Further evaluation and management were not discussed in detail.    4. Headaches.  The patient reports occasional headaches. No specific treatment plan was discussed.    5. Joint Pain.  The patient reports pain in his hands and joints. No specific treatment plan was discussed.    6. Medication Management.  The patient is currently taking Depakote 1000 mg in the morning and 500 mg at night. The Depakote level needs to be checked to ensure it is not toxic. Orders for an A1c, lipid panel, valproic acid level, CBC, and CMP have been placed. The patient will hold his morning medications and take them after the labs are drawn.          Patient Education:       Reviewed medications, potential side effects and signs and symptoms to report. Discussed risk versus benefits of treatment plan with patient and/or family-including medications, labs and radiology that may be ordered. Addressed questions and concerns during visit. Patient and/or family verbalized understanding and agree with plan. Instructed to call the office with any questions and report to ER with any life-threatening symptoms.     Follow Up:   Return in about 1 year (around  12/3/2025) for Recheck.    During this visit the following were done:  Labs Reviewed [x]    Labs Ordered [x]    Radiology Reports Reviewed []    Radiology Ordered []    PCP Records Reviewed []    Referring Provider Records Reviewed []    ER Records Reviewed []    Hospital Records Reviewed []    History Obtained From Family [x]    Radiology Images Reviewed []    Other Reviewed [x]    Records Requested []      Patient or patient representative verbalized consent for the use of Ambient Listening during the visit with  Deandre Fisher DNP, APRN for chart documentation. 12/3/2024  13:13 EST      Deandre Fisher DNP, APRN

## 2024-12-04 ENCOUNTER — LAB (OUTPATIENT)
Dept: LAB | Facility: HOSPITAL | Age: 34
End: 2024-12-04
Payer: MEDICAID

## 2024-12-04 DIAGNOSIS — E66.812 CLASS 2 OBESITY: ICD-10-CM

## 2024-12-04 DIAGNOSIS — G40.009 PARTIAL IDIOPATHIC EPILEPSY WITH SEIZURES OF LOCALIZED ONSET, NOT INTRACTABLE, WITHOUT STATUS EPILEPTICUS: ICD-10-CM

## 2024-12-04 LAB
ALBUMIN SERPL-MCNC: 4 G/DL (ref 3.5–5.2)
ALBUMIN/GLOB SERPL: 1.8 G/DL
ALP SERPL-CCNC: 35 U/L (ref 39–117)
ALT SERPL W P-5'-P-CCNC: 14 U/L (ref 1–41)
ANION GAP SERPL CALCULATED.3IONS-SCNC: 8 MMOL/L (ref 5–15)
AST SERPL-CCNC: 19 U/L (ref 1–40)
BASOPHILS # BLD AUTO: 0.05 10*3/MM3 (ref 0–0.2)
BASOPHILS NFR BLD AUTO: 0.7 % (ref 0–1.5)
BILIRUB SERPL-MCNC: 0.3 MG/DL (ref 0–1.2)
BUN SERPL-MCNC: 12 MG/DL (ref 6–20)
BUN/CREAT SERPL: 13 (ref 7–25)
CALCIUM SPEC-SCNC: 9 MG/DL (ref 8.6–10.5)
CHLORIDE SERPL-SCNC: 106 MMOL/L (ref 98–107)
CHOLEST SERPL-MCNC: 180 MG/DL (ref 0–200)
CO2 SERPL-SCNC: 24 MMOL/L (ref 22–29)
CREAT SERPL-MCNC: 0.92 MG/DL (ref 0.76–1.27)
DEPRECATED RDW RBC AUTO: 39.4 FL (ref 37–54)
EGFRCR SERPLBLD CKD-EPI 2021: 111.9 ML/MIN/1.73
EOSINOPHIL # BLD AUTO: 0.39 10*3/MM3 (ref 0–0.4)
EOSINOPHIL NFR BLD AUTO: 5.7 % (ref 0.3–6.2)
ERYTHROCYTE [DISTWIDTH] IN BLOOD BY AUTOMATED COUNT: 12 % (ref 12.3–15.4)
GLOBULIN UR ELPH-MCNC: 2.2 GM/DL
GLUCOSE SERPL-MCNC: 90 MG/DL (ref 65–99)
HBA1C MFR BLD: 5.3 % (ref 4.8–5.6)
HCT VFR BLD AUTO: 42.9 % (ref 37.5–51)
HDLC SERPL-MCNC: 50 MG/DL (ref 40–60)
HGB BLD-MCNC: 14.4 G/DL (ref 13–17.7)
IMM GRANULOCYTES # BLD AUTO: 0.04 10*3/MM3 (ref 0–0.05)
IMM GRANULOCYTES NFR BLD AUTO: 0.6 % (ref 0–0.5)
LDLC SERPL CALC-MCNC: 110 MG/DL (ref 0–100)
LDLC/HDLC SERPL: 2.14 {RATIO}
LYMPHOCYTES # BLD AUTO: 2.41 10*3/MM3 (ref 0.7–3.1)
LYMPHOCYTES NFR BLD AUTO: 35.3 % (ref 19.6–45.3)
MCH RBC QN AUTO: 30.4 PG (ref 26.6–33)
MCHC RBC AUTO-ENTMCNC: 33.6 G/DL (ref 31.5–35.7)
MCV RBC AUTO: 90.7 FL (ref 79–97)
MONOCYTES # BLD AUTO: 0.72 10*3/MM3 (ref 0.1–0.9)
MONOCYTES NFR BLD AUTO: 10.6 % (ref 5–12)
NEUTROPHILS NFR BLD AUTO: 3.21 10*3/MM3 (ref 1.7–7)
NEUTROPHILS NFR BLD AUTO: 47.1 % (ref 42.7–76)
NRBC BLD AUTO-RTO: 0 /100 WBC (ref 0–0.2)
PLATELET # BLD AUTO: 158 10*3/MM3 (ref 140–450)
PMV BLD AUTO: 10 FL (ref 6–12)
POTASSIUM SERPL-SCNC: 4.1 MMOL/L (ref 3.5–5.2)
PROT SERPL-MCNC: 6.2 G/DL (ref 6–8.5)
RBC # BLD AUTO: 4.73 10*6/MM3 (ref 4.14–5.8)
SODIUM SERPL-SCNC: 138 MMOL/L (ref 136–145)
TRIGL SERPL-MCNC: 114 MG/DL (ref 0–150)
VALPROATE SERPL-MCNC: 76 MCG/ML (ref 50–125)
VLDLC SERPL-MCNC: 20 MG/DL (ref 5–40)
WBC NRBC COR # BLD AUTO: 6.82 10*3/MM3 (ref 3.4–10.8)

## 2024-12-04 PROCEDURE — 85025 COMPLETE CBC W/AUTO DIFF WBC: CPT

## 2024-12-04 PROCEDURE — 80061 LIPID PANEL: CPT

## 2024-12-04 PROCEDURE — 80164 ASSAY DIPROPYLACETIC ACD TOT: CPT

## 2024-12-04 PROCEDURE — 36415 COLL VENOUS BLD VENIPUNCTURE: CPT

## 2024-12-04 PROCEDURE — 80053 COMPREHEN METABOLIC PANEL: CPT

## 2024-12-04 PROCEDURE — 83036 HEMOGLOBIN GLYCOSYLATED A1C: CPT

## 2024-12-05 ENCOUNTER — TELEPHONE (OUTPATIENT)
Dept: NEUROLOGY | Facility: CLINIC | Age: 34
End: 2024-12-05
Payer: MEDICAID

## 2024-12-05 NOTE — TELEPHONE ENCOUNTER
Called and spoke with caregiver Brissa and gave results.      ----- Message from Deandre Fisher sent at 12/5/2024  8:03 AM EST -----  Please notify Alton's care givers lipids are stable with mild elevation of LDL. Kidney and liver function is stable. No anemia. Depakote level is stable. Diabetes level is normal.